# Patient Record
Sex: FEMALE | Race: BLACK OR AFRICAN AMERICAN | NOT HISPANIC OR LATINO | Employment: FULL TIME | URBAN - METROPOLITAN AREA
[De-identification: names, ages, dates, MRNs, and addresses within clinical notes are randomized per-mention and may not be internally consistent; named-entity substitution may affect disease eponyms.]

---

## 2019-01-28 ENCOUNTER — OFFICE VISIT (OUTPATIENT)
Dept: NEPHROLOGY | Facility: CLINIC | Age: 49
End: 2019-01-28
Payer: COMMERCIAL

## 2019-01-28 ENCOUNTER — LAB VISIT (OUTPATIENT)
Dept: LAB | Facility: HOSPITAL | Age: 49
End: 2019-01-28
Attending: INTERNAL MEDICINE
Payer: COMMERCIAL

## 2019-01-28 VITALS
WEIGHT: 260.81 LBS | HEIGHT: 62 IN | SYSTOLIC BLOOD PRESSURE: 100 MMHG | HEART RATE: 74 BPM | DIASTOLIC BLOOD PRESSURE: 60 MMHG | BODY MASS INDEX: 47.99 KG/M2

## 2019-01-28 DIAGNOSIS — N18.30 CHRONIC KIDNEY DISEASE (CKD), STAGE III (MODERATE): ICD-10-CM

## 2019-01-28 DIAGNOSIS — N18.30 CHRONIC KIDNEY DISEASE (CKD), STAGE III (MODERATE): Primary | ICD-10-CM

## 2019-01-28 LAB
ALBUMIN SERPL BCP-MCNC: 3.4 G/DL
ANION GAP SERPL CALC-SCNC: 10 MMOL/L
BUN SERPL-MCNC: 7 MG/DL
CALCIUM SERPL-MCNC: 9.4 MG/DL
CHLORIDE SERPL-SCNC: 104 MMOL/L
CO2 SERPL-SCNC: 23 MMOL/L
CREAT SERPL-MCNC: 1.2 MG/DL
EST. GFR  (AFRICAN AMERICAN): >60 ML/MIN/1.73 M^2
EST. GFR  (NON AFRICAN AMERICAN): 54.3 ML/MIN/1.73 M^2
GLUCOSE SERPL-MCNC: 71 MG/DL
PHOSPHATE SERPL-MCNC: 3 MG/DL
POTASSIUM SERPL-SCNC: 3.8 MMOL/L
SODIUM SERPL-SCNC: 137 MMOL/L

## 2019-01-28 PROCEDURE — 36415 COLL VENOUS BLD VENIPUNCTURE: CPT

## 2019-01-28 PROCEDURE — 99204 PR OFFICE/OUTPT VISIT, NEW, LEVL IV, 45-59 MIN: ICD-10-PCS | Mod: S$GLB,,, | Performed by: INTERNAL MEDICINE

## 2019-01-28 PROCEDURE — 99204 OFFICE O/P NEW MOD 45 MIN: CPT | Mod: S$GLB,,, | Performed by: INTERNAL MEDICINE

## 2019-01-28 PROCEDURE — 99999 PR PBB SHADOW E&M-EST. PATIENT-LVL III: ICD-10-PCS | Mod: PBBFAC,,, | Performed by: INTERNAL MEDICINE

## 2019-01-28 PROCEDURE — 80069 RENAL FUNCTION PANEL: CPT

## 2019-01-28 PROCEDURE — 99999 PR PBB SHADOW E&M-EST. PATIENT-LVL III: CPT | Mod: PBBFAC,,, | Performed by: INTERNAL MEDICINE

## 2019-01-28 RX ORDER — SEMAGLUTIDE 1.34 MG/ML
1 INJECTION, SOLUTION SUBCUTANEOUS WEEKLY
COMMUNITY
Start: 2019-01-04 | End: 2023-02-07 | Stop reason: SDUPTHER

## 2019-01-28 RX ORDER — RIZATRIPTAN BENZOATE 10 MG/1
TABLET ORAL
COMMUNITY
Start: 2018-05-21 | End: 2023-02-07

## 2019-01-28 RX ORDER — ALPRAZOLAM 0.5 MG/1
1 TABLET ORAL DAILY
COMMUNITY
Start: 2019-01-07 | End: 2023-02-07 | Stop reason: SDUPTHER

## 2019-01-28 RX ORDER — LAMOTRIGINE 100 MG/1
TABLET ORAL
COMMUNITY
Start: 2018-12-09 | End: 2023-02-07

## 2019-01-28 RX ORDER — PANTOPRAZOLE SODIUM 40 MG/1
TABLET, DELAYED RELEASE ORAL
COMMUNITY
Start: 2017-03-28 | End: 2023-05-18 | Stop reason: SDUPTHER

## 2019-01-28 RX ORDER — AMLODIPINE BESYLATE 10 MG/1
5 TABLET ORAL DAILY
COMMUNITY
End: 2019-04-22

## 2019-01-28 RX ORDER — AMITRIPTYLINE HYDROCHLORIDE 100 MG/1
TABLET ORAL
COMMUNITY
Start: 2018-02-23 | End: 2020-10-14

## 2019-01-28 RX ORDER — LEVOTHYROXINE SODIUM 88 UG/1
1 TABLET ORAL DAILY
COMMUNITY
Start: 2019-01-04 | End: 2023-02-07

## 2019-01-28 RX ORDER — TOPIRAMATE 50 MG/1
1 CAPSULE, EXTENDED RELEASE ORAL DAILY
COMMUNITY
Start: 2018-12-09 | End: 2023-05-18 | Stop reason: ALTCHOICE

## 2019-01-28 RX ORDER — TRAMADOL HYDROCHLORIDE 50 MG/1
TABLET ORAL
COMMUNITY
Start: 2018-06-14 | End: 2023-02-07

## 2019-01-28 RX ORDER — DULOXETIN HYDROCHLORIDE 60 MG/1
CAPSULE, DELAYED RELEASE ORAL
COMMUNITY
Start: 2018-04-05 | End: 2023-02-07

## 2019-01-28 RX ORDER — TRIAMTERENE/HYDROCHLOROTHIAZID 37.5-25 MG
1 TABLET ORAL EVERY OTHER DAY
COMMUNITY
Start: 2018-12-09 | End: 2020-10-14

## 2019-01-28 NOTE — PATIENT INSTRUCTIONS
Avoid NSAID pain medications such as advil, aleve, motrin, ibuprofen, naprosyn, meloxicam, diclofenac, mobic.     Reduce amlodipine to 5 mg daily      goal blood pressure is less than 130/80    keep a track of daily BPs and bring at next appt     bring all meds in a bag at next appt

## 2019-01-28 NOTE — PROGRESS NOTES
Subjective:       Patient ID: Madeleine Sheikh is a 48 y.o.   female who presents for new evaluation of TEZ , HTN, CKD stage 2/3     Allegra Cruz MD        HPI :  Madeleine Sheikh is a pleasant 48-year-old  woman seen in office today in consultation for above medical problems on referral from primary MD.  Pertinent previous provider notes and lab results were reviewed.  Lab results reviewed from Care everywhere.  Patient has longstanding history of type 2 diabetes and hypertension.  She was recently started on Invokana, following starting this medication serum creatinine increased to 1.4 mg/dL according to the patient, no prior labs available for comparison, her GFR dropped to 50 mL/minute, patient discontinued the medication, repeat labs from today are pending at this time, she denies recent NSAID use.  She has long history of migraine headaches in closely followed by Neurology Department.  She also has history of fibromyalgia and used to see Rheumatology in the past.  No recent hospitalizations or ER visits.          Past Medical History:   Diagnosis Date    CKD (chronic kidney disease) stage 3, GFR 30-59 ml/min     Diabetes mellitus     Hypertension     Migraine        PSH : Appendectomy, C section, Lap Bernie, Gastric bypass,     Current Outpatient Medications on File Prior to Visit   Medication Sig Dispense Refill    ALPRAZolam (XANAX) 0.5 MG tablet Take 1 tablet by mouth once daily.      amitriptyline (ELAVIL) 100 MG tablet TAKE 1 TABLET EVERY DAY      amLODIPine (NORVASC) 10 MG tablet Take 10 mg by mouth once daily.      cholecalciferol, vitamin D3, 4,000 unit Cap 2 capsules every day by oral route.      DULoxetine (CYMBALTA) 60 MG capsule Take 1 capsule every day by oral route at bedtime for 90 days.      lamoTRIgine (LAMICTAL) 100 MG tablet       levothyroxine (SYNTHROID) 88 MCG tablet Take 1 tablet by mouth once daily.      OZEMPIC 1 mg/0.75  mL (2 mg/1.5 mL) PnIj       pantoprazole (PROTONIX) 40 MG tablet Take 1 tablet every day by oral route for 90 days.      rizatriptan (MAXALT) 10 MG tablet TAKE 1 PILL @ START OF MIGRAINE MAY REPEAT IN 2 HOURS IF NEEDED. MAX OF 2 PILLS IN 24 HOURS      traMADol (ULTRAM) 50 mg tablet Take 1 tablet every 6 hours by oral route as needed.      triamterene-hydrochlorothiazide 37.5-25 mg (MAXZIDE-25) 37.5-25 mg per tablet Take 1 tablet by mouth every other day.      TROKENDI XR 50 mg Cp24 Take 1 tablet by mouth once daily.       No current facility-administered medications on file prior to visit.          Review of patient's allergies indicates:   Allergen Reactions    Amoxicillin-pot clavulanate Anaphylaxis and Other (See Comments)     Liver issues       Azithromycin Swelling and Nausea And Vomiting    Doxycycline Shortness Of Breath and Nausea And Vomiting    Morphine Hallucinations and Shortness Of Breath     hallucinations      Sumatriptan Swelling    Tioconazole Hives    Varenicline Shortness Of Breath and Swelling     headaches       FH : denies FH of kidney disease    SH : works for health insurance,  Current every day smoker ( 3 Cigs a day), no alcohol, has a son,       Review of Systems   Constitutional: Negative for activity change, appetite change, fatigue and fever.   HENT: Negative for congestion, facial swelling, sore throat, trouble swallowing and voice change.    Eyes: Negative for redness and visual disturbance.   Respiratory: Negative for apnea, cough, chest tightness, shortness of breath and wheezing.    Cardiovascular: Negative for chest pain, palpitations and leg swelling.   Gastrointestinal: Negative for abdominal distention, abdominal pain, blood in stool, constipation, diarrhea, nausea and vomiting.   Genitourinary: Negative for decreased urine volume, difficulty urinating, dysuria, flank pain, frequency, hematuria, pelvic pain and urgency.   Musculoskeletal: Positive for arthralgias.  Negative for back pain, gait problem and joint swelling.   Skin: Negative for color change and rash.   Neurological: Negative for dizziness, syncope, weakness and headaches.   Hematological: Does not bruise/bleed easily.   Psychiatric/Behavioral: Negative for agitation, behavioral problems and confusion. The patient is not nervous/anxious.                  Objective:         Vitals:    01/28/19 0839   BP: 100/60   Pulse: 74       Weight 260 lbs ,         Physical Exam   Constitutional: She is oriented to person, place, and time. She appears well-developed and well-nourished. No distress.   HENT:   Head: Normocephalic and atraumatic.   Mouth/Throat: Oropharynx is clear and moist. No oropharyngeal exudate.   Eyes: Conjunctivae and EOM are normal. Pupils are equal, round, and reactive to light.   Neck: Normal range of motion. Neck supple. No JVD present. Carotid bruit is not present. No tracheal deviation present. No thyroid mass and no thyromegaly present.   Cardiovascular: Normal rate, regular rhythm, normal heart sounds and intact distal pulses. Exam reveals no gallop and no friction rub.   No murmur heard.  Pulmonary/Chest: Effort normal and breath sounds normal. No respiratory distress. She has no wheezes. She has no rales. She exhibits no tenderness.   Abdominal: Soft. Bowel sounds are normal. She exhibits no distension, no abdominal bruit, no ascites and no mass. There is no hepatosplenomegaly. There is no tenderness. There is no rebound, no guarding and no CVA tenderness.   Obese abdomen    Musculoskeletal: Normal range of motion. She exhibits no edema or tenderness.   Lymphadenopathy:     She has no cervical adenopathy.   Neurological: She is alert and oriented to person, place, and time. She has normal reflexes. She displays normal reflexes. No cranial nerve deficit. She exhibits normal muscle tone. Coordination normal.   Skin: Skin is warm and intact. No rash noted. No erythema. No pallor.   Psychiatric: She  has a normal mood and affect. Her behavior is normal. Judgment normal.     :            Labs Reviewed from Care Every Where      Impression and Plan :  48-year-old  woman seen in office today in consultation for following medical problems    1. TEZ on CKD stage 2/3 :  Limited lab data available at this time, her serum creatinine was 1.4 on 12/21/2018, repeat labs from today are pending at this time, patient discontinued Invokana, denies recent NSAID use, chronic kidney disease due to longstanding history of hypertension and diabetes,    2.  Hypertension - blood pressure on the low side, advised patient to reduce amlodipine to 5 mg daily, continue Dyazide, continue to monitor blood pressures at home, target blood pressure less than 130/80,    3.  Type 2 diabetes - discussed adherence with diet and medications,    4.  Morbid obesity - discuss calorie restriction, daily exercise,    5.  Discussed smoking cessation,    6.  Check urinalysis to evaluate for proteinuria and hematuria    Return to clinic in about 3 months, more than 40 min of face-to-face time was spent with the patient discussing labs and plan of care, also time was spent educating patient about chronic kidney disease.  Thank you Dr. Cruz for involving us in the care of this pleasant woman.    Sincerely,    Francisco Herman MD    Cc to Allegra Cruz MD

## 2019-01-28 NOTE — LETTER
January 28, 2019      Allegra Cruz MD  [address]  [place] [address]           [facility]  [address]  [place] [address]  Phone: [phone]  Fax: [phone]          Patient: Madeleine Sheikh   MR Number: [id number]   YOB: 1970   Date of Visit: 1/28/2019       Dear Dr. Allegra Cruz:    Thank you for referring Madeleine Sheikh to me for evaluation. Attached you will find relevant portions of my assessment and plan of care.    If you have questions, please do not hesitate to call me. I look forward to following Madeleine Sheikh along with you.    Sincerely,    Francisco Herman MD    Enclosure  CC:  No Recipients    If you would like to receive this communication electronically, please contact [email or web address] or [phone] to request more information on [employer] Link access.    For providers and/or their staff who would like to refer a patient to [facility], please contact us through our one-stop-shop provider referral line, [facility], at [phone].    If you feel you have received this communication in error or would no longer like to receive these types of communications, please e-mail [email or web address]

## 2019-04-15 ENCOUNTER — LAB VISIT (OUTPATIENT)
Dept: LAB | Facility: HOSPITAL | Age: 49
End: 2019-04-15
Attending: INTERNAL MEDICINE
Payer: COMMERCIAL

## 2019-04-15 DIAGNOSIS — N18.30 CHRONIC KIDNEY DISEASE (CKD), STAGE III (MODERATE): ICD-10-CM

## 2019-04-15 LAB
ALBUMIN SERPL BCP-MCNC: 3.3 G/DL (ref 3.5–5.2)
ANION GAP SERPL CALC-SCNC: 8 MMOL/L (ref 8–16)
BASOPHILS # BLD AUTO: 0.04 K/UL (ref 0–0.2)
BASOPHILS NFR BLD: 0.4 % (ref 0–1.9)
BUN SERPL-MCNC: 7 MG/DL (ref 6–20)
CALCIUM SERPL-MCNC: 9.6 MG/DL (ref 8.7–10.5)
CHLORIDE SERPL-SCNC: 97 MMOL/L (ref 95–110)
CO2 SERPL-SCNC: 29 MMOL/L (ref 23–29)
CREAT SERPL-MCNC: 1.2 MG/DL (ref 0.5–1.4)
DIFFERENTIAL METHOD: ABNORMAL
EOSINOPHIL # BLD AUTO: 0.2 K/UL (ref 0–0.5)
EOSINOPHIL NFR BLD: 1.7 % (ref 0–8)
ERYTHROCYTE [DISTWIDTH] IN BLOOD BY AUTOMATED COUNT: 18.9 % (ref 11.5–14.5)
EST. GFR  (AFRICAN AMERICAN): >60 ML/MIN/1.73 M^2
EST. GFR  (NON AFRICAN AMERICAN): 54.3 ML/MIN/1.73 M^2
GLUCOSE SERPL-MCNC: 66 MG/DL (ref 70–110)
HCT VFR BLD AUTO: 34.8 % (ref 37–48.5)
HGB BLD-MCNC: 10.8 G/DL (ref 12–16)
IMM GRANULOCYTES # BLD AUTO: 0.03 K/UL (ref 0–0.04)
IMM GRANULOCYTES NFR BLD AUTO: 0.3 % (ref 0–0.5)
LYMPHOCYTES # BLD AUTO: 3.7 K/UL (ref 1–4.8)
LYMPHOCYTES NFR BLD: 33.5 % (ref 18–48)
MCH RBC QN AUTO: 24.5 PG (ref 27–31)
MCHC RBC AUTO-ENTMCNC: 31 G/DL (ref 32–36)
MCV RBC AUTO: 79 FL (ref 82–98)
MONOCYTES # BLD AUTO: 0.5 K/UL (ref 0.3–1)
MONOCYTES NFR BLD: 4.8 % (ref 4–15)
NEUTROPHILS # BLD AUTO: 6.5 K/UL (ref 1.8–7.7)
NEUTROPHILS NFR BLD: 59.3 % (ref 38–73)
NRBC BLD-RTO: 0 /100 WBC
PHOSPHATE SERPL-MCNC: 3.3 MG/DL (ref 2.7–4.5)
PLATELET # BLD AUTO: 505 K/UL (ref 150–350)
PMV BLD AUTO: 9.8 FL (ref 9.2–12.9)
POTASSIUM SERPL-SCNC: 3.7 MMOL/L (ref 3.5–5.1)
RBC # BLD AUTO: 4.41 M/UL (ref 4–5.4)
SODIUM SERPL-SCNC: 134 MMOL/L (ref 136–145)
WBC # BLD AUTO: 10.98 K/UL (ref 3.9–12.7)

## 2019-04-15 PROCEDURE — 36415 COLL VENOUS BLD VENIPUNCTURE: CPT

## 2019-04-15 PROCEDURE — 85025 COMPLETE CBC W/AUTO DIFF WBC: CPT

## 2019-04-15 PROCEDURE — 80069 RENAL FUNCTION PANEL: CPT

## 2019-04-22 ENCOUNTER — OFFICE VISIT (OUTPATIENT)
Dept: NEPHROLOGY | Facility: CLINIC | Age: 49
End: 2019-04-22
Payer: COMMERCIAL

## 2019-04-22 VITALS
HEIGHT: 62 IN | SYSTOLIC BLOOD PRESSURE: 114 MMHG | BODY MASS INDEX: 45.28 KG/M2 | HEART RATE: 72 BPM | DIASTOLIC BLOOD PRESSURE: 74 MMHG | WEIGHT: 246.06 LBS

## 2019-04-22 DIAGNOSIS — I10 HTN (HYPERTENSION), BENIGN: Primary | ICD-10-CM

## 2019-04-22 PROCEDURE — 99999 PR PBB SHADOW E&M-EST. PATIENT-LVL III: CPT | Mod: PBBFAC,,, | Performed by: INTERNAL MEDICINE

## 2019-04-22 PROCEDURE — 99214 PR OFFICE/OUTPT VISIT, EST, LEVL IV, 30-39 MIN: ICD-10-PCS | Mod: S$GLB,,, | Performed by: INTERNAL MEDICINE

## 2019-04-22 PROCEDURE — 99999 PR PBB SHADOW E&M-EST. PATIENT-LVL III: ICD-10-PCS | Mod: PBBFAC,,, | Performed by: INTERNAL MEDICINE

## 2019-04-22 PROCEDURE — 99214 OFFICE O/P EST MOD 30 MIN: CPT | Mod: S$GLB,,, | Performed by: INTERNAL MEDICINE

## 2019-04-22 RX ORDER — ACETAMINOPHEN AND PHENYLEPHRINE HCL 325; 5 MG/1; MG/1
TABLET ORAL
COMMUNITY
Start: 2018-01-05 | End: 2023-02-07 | Stop reason: SDUPTHER

## 2019-04-22 RX ORDER — CYANOCOBALAMIN (VITAMIN B-12) 500 MCG
TABLET ORAL
COMMUNITY
Start: 2018-01-05 | End: 2023-02-07 | Stop reason: SDUPTHER

## 2019-04-22 NOTE — LETTER
April 22, 2019        Allegra Cruz MD  [address]  [place] [address]             [facility]  [address]  [place] [address]  Phone: [phone]  Fax: [phone]   Patient: Madeleine Sheikh   MR Number: [id number]   YOB: 1970   Date of Visit: 4/22/2019       Dear Dr. Cruz:    Thank you for referring Madeleine Sheikh to me for evaluation. Attached you will find relevant portions of my assessment and plan of care.    If you have questions, please do not hesitate to call me. I look forward to following Madeleine Sheikh along with you.    Sincerely,      Francisco Herman MD

## 2019-04-22 NOTE — PROGRESS NOTES
Subjective:       Patient ID: Madeleine Sheikh is a 48 y.o.   female who presents for follow-up evaluation of HTN, CKD stage 2/3 , DM-2        Allegra Cruz MD      HPI :  Madeleine Sheikh is a pleasant 48-year-old  woman seen in office today in f/u  for above medical problems .  I saw her for initial consult about 3 months ago.  Recent labs reviewed and discussed with the patient.  Serum creatinine improved from 1.4 mg/dL to 1.2  mg/dL on recent labs, acute kidney injury likely due to Invokana that she was taking at that time, patient denies NSAID use,     Patient has longstanding history of type 2 diabetes and hypertension.   she denies recent NSAID use.  She has long history of migraine headaches in closely followed by Neurology Department.  She also has history of fibromyalgia and used to see Rheumatology in the past.  No recent hospitalizations or ER visits.             Past Medical History:   Diagnosis Date    CKD (chronic kidney disease) stage 3, GFR 30-59 ml/min     Diabetes mellitus     Hypertension     Migraine          Current Outpatient Medications on File Prior to Visit   Medication Sig Dispense Refill    ALPRAZolam (XANAX) 0.5 MG tablet Take 1 tablet by mouth once daily.      amitriptyline (ELAVIL) 100 MG tablet TAKE 1 TABLET EVERY DAY      biotin 10,000 mcg Cap       cholecalciferol, vitamin D3, 4,000 unit Cap 2 capsules every day by oral route.      DULoxetine (CYMBALTA) 60 MG capsule Take 1 capsule every day by oral route at bedtime for 90 days.      erenumab-aooe (AIMOVIG AUTOINJECTOR SUBQ) every 30 days.       folic acid 0.8 mg Cap       lamoTRIgine (LAMICTAL) 100 MG tablet       levothyroxine (SYNTHROID) 88 MCG tablet Take 1 tablet by mouth once daily.      m-vit,tx,iron,mins/calc/folic (MULTIVITAMIN) Tab       OZEMPIC 1 mg/0.75 mL (2 mg/1.5 mL) PnIj       pantoprazole (PROTONIX) 40 MG tablet Take 1 tablet every day by oral  route for 90 days.      rizatriptan (MAXALT) 10 MG tablet TAKE 1 PILL @ START OF MIGRAINE MAY REPEAT IN 2 HOURS IF NEEDED. MAX OF 2 PILLS IN 24 HOURS      traMADol (ULTRAM) 50 mg tablet Take 1 tablet every 6 hours by oral route as needed.      triamterene-hydrochlorothiazide 37.5-25 mg (MAXZIDE-25) 37.5-25 mg per tablet Take 1 tablet by mouth every other day.      TROKENDI XR 50 mg Cp24 Take 1 tablet by mouth once daily.      [DISCONTINUED] amLODIPine (NORVASC) 10 MG tablet Take 5 mg by mouth once daily.       No current facility-administered medications on file prior to visit.        Review of Systems   Constitutional: Negative for activity change, appetite change, fatigue and fever.   HENT: Negative for congestion, facial swelling, sore throat, trouble swallowing and voice change.    Eyes: Negative for redness and visual disturbance.   Respiratory: Negative for apnea, cough, chest tightness, shortness of breath and wheezing.    Cardiovascular: Negative for chest pain, palpitations and leg swelling.   Gastrointestinal: Negative for abdominal distention, abdominal pain, blood in stool, constipation, diarrhea, nausea and vomiting.   Genitourinary: Negative for decreased urine volume, difficulty urinating, dysuria, flank pain, frequency, hematuria, pelvic pain and urgency.   Musculoskeletal: Positive for arthralgias. Negative for back pain, gait problem and joint swelling.   Skin: Negative for color change and rash.   Neurological: Negative for dizziness, syncope, weakness and headaches.   Hematological: Does not bruise/bleed easily.   Psychiatric/Behavioral: Negative for agitation, behavioral problems and confusion. The patient is not nervous/anxious.      :            Objective:           Vitals:    04/22/19 1543   BP: 114/74   Pulse: 72       Weight 246 lbs, last weight 260 lbs       Physical Exam  :      Constitutional: She is oriented to person, place, and time. She appears well-developed and well-nourished.  No distress.   HENT: Head: Normocephalic and atraumatic. : Oropharynx is clear and moist. No oropharyngeal exudate.   Eyes: Conjunctivae and EOM are normal. Pupils are equal, round, and reactive to light.   Neck: Normal range of motion. Neck supple. No JVD present. Carotid bruit is not present. No tracheal deviation present. No thyroid mass and no thyromegaly present.   Cardiovascular: Normal rate, regular rhythm, normal heart sounds and intact distal pulses. Exam reveals no gallop and no friction rub.   No murmur heard.  Pulmonary/Chest: Effort normal and breath sounds normal. No respiratory distress. She has no wheezes. She has no rales. She exhibits no tenderness.   Abdominal: Soft. Bowel sounds are normal. She exhibits no distension, no abdominal bruit, no ascites and no mass. There is no hepatosplenomegaly. There is no tenderness. There is no rebound, no guarding and no CVA tenderness.   Obese abdomen    Musculoskeletal: Normal range of motion. She exhibits no edema or tenderness.   Lymphadenopathy:   She has no cervical adenopathy.   Neurological: She is alert and oriented to person, place, and time.  No cranial nerve deficit. She exhibits normal muscle tone. Coordination normal.   Skin: Skin is warm and intact. No rash noted. No erythema. No pallor.   Psychiatric: She has a normal mood and affect. Her behavior is normal. Judgment normal.     :      Labs:    Lab Results   Component Value Date    CREATININE 1.2 04/15/2019    BUN 7 04/15/2019     (L) 04/15/2019    K 3.7 04/15/2019    CL 97 04/15/2019    CO2 29 04/15/2019       Lab Results   Component Value Date    WBC 10.98 04/15/2019    HGB 10.8 (L) 04/15/2019    HCT 34.8 (L) 04/15/2019    MCV 79 (L) 04/15/2019     (H) 04/15/2019       Lab Results   Component Value Date    CALCIUM 9.6 04/15/2019    PHOS 3.3 04/15/2019       Impression and Plan :  48-year-old  woman seen in office today in f/u for following medical problems     1.  TEZ on CKD stage 2 :  resolved, recent serum creatinine is 1.2 mg/dL, acute kidney injury likely due to Invokana .      2.  Hypertension - blood pressure controlled,  target blood pressure less than 130/80, currently on Maxzide 37.5 / 25 mg every other day,    In the interim she tried amlodipine, caused peripheral edema, Bystolic dropped her blood pressures,     3.  Type 2 diabetes - discussed adherence with diet and medications,     4.  Morbid obesity  - discussed calorie restriction, daily exercise, has been losing weight      5.  Discussed smoking cessation,     6.  urinalysis reviewed , no proteinuria, mild hematuria, follow,     7. Lytes, mild hyponatremia noted, will monitor     Return to clinic in about 6 months, more than  25 min of face-to-face time was spent with the patient discussing labs and plan of care,      Francisco Herman MD

## 2019-10-03 ENCOUNTER — LAB VISIT (OUTPATIENT)
Dept: LAB | Facility: HOSPITAL | Age: 49
End: 2019-10-03
Attending: INTERNAL MEDICINE
Payer: COMMERCIAL

## 2019-10-03 DIAGNOSIS — I10 HTN (HYPERTENSION), BENIGN: ICD-10-CM

## 2019-10-03 LAB
ALBUMIN SERPL BCP-MCNC: 3.8 G/DL (ref 3.5–5.2)
ANION GAP SERPL CALC-SCNC: 11 MMOL/L (ref 8–16)
BASOPHILS # BLD AUTO: 0.05 K/UL (ref 0–0.2)
BASOPHILS NFR BLD: 0.4 % (ref 0–1.9)
BUN SERPL-MCNC: 15 MG/DL (ref 6–20)
CALCIUM SERPL-MCNC: 9.8 MG/DL (ref 8.7–10.5)
CHLORIDE SERPL-SCNC: 97 MMOL/L (ref 95–110)
CO2 SERPL-SCNC: 27 MMOL/L (ref 23–29)
CREAT SERPL-MCNC: 1.3 MG/DL (ref 0.5–1.4)
DIFFERENTIAL METHOD: ABNORMAL
EOSINOPHIL # BLD AUTO: 0.1 K/UL (ref 0–0.5)
EOSINOPHIL NFR BLD: 1.1 % (ref 0–8)
ERYTHROCYTE [DISTWIDTH] IN BLOOD BY AUTOMATED COUNT: 14.2 % (ref 11.5–14.5)
EST. GFR  (AFRICAN AMERICAN): 56.5 ML/MIN/1.73 M^2
EST. GFR  (NON AFRICAN AMERICAN): 49 ML/MIN/1.73 M^2
GLUCOSE SERPL-MCNC: 75 MG/DL (ref 70–110)
HCT VFR BLD AUTO: 41.4 % (ref 37–48.5)
HGB BLD-MCNC: 13.9 G/DL (ref 12–16)
IMM GRANULOCYTES # BLD AUTO: 0.03 K/UL (ref 0–0.04)
IMM GRANULOCYTES NFR BLD AUTO: 0.2 % (ref 0–0.5)
LYMPHOCYTES # BLD AUTO: 4 K/UL (ref 1–4.8)
LYMPHOCYTES NFR BLD: 32.5 % (ref 18–48)
MCH RBC QN AUTO: 31.6 PG (ref 27–31)
MCHC RBC AUTO-ENTMCNC: 33.6 G/DL (ref 32–36)
MCV RBC AUTO: 94 FL (ref 82–98)
MONOCYTES # BLD AUTO: 0.7 K/UL (ref 0.3–1)
MONOCYTES NFR BLD: 5.3 % (ref 4–15)
NEUTROPHILS # BLD AUTO: 7.4 K/UL (ref 1.8–7.7)
NEUTROPHILS NFR BLD: 60.5 % (ref 38–73)
NRBC BLD-RTO: 0 /100 WBC
PHOSPHATE SERPL-MCNC: 3 MG/DL (ref 2.7–4.5)
PLATELET # BLD AUTO: 351 K/UL (ref 150–350)
PMV BLD AUTO: 10.8 FL (ref 9.2–12.9)
POTASSIUM SERPL-SCNC: 3.8 MMOL/L (ref 3.5–5.1)
RBC # BLD AUTO: 4.4 M/UL (ref 4–5.4)
SODIUM SERPL-SCNC: 135 MMOL/L (ref 136–145)
WBC # BLD AUTO: 12.22 K/UL (ref 3.9–12.7)

## 2019-10-03 PROCEDURE — 80069 RENAL FUNCTION PANEL: CPT

## 2019-10-03 PROCEDURE — 85025 COMPLETE CBC W/AUTO DIFF WBC: CPT

## 2019-10-03 PROCEDURE — 36415 COLL VENOUS BLD VENIPUNCTURE: CPT

## 2019-10-10 ENCOUNTER — OFFICE VISIT (OUTPATIENT)
Dept: NEPHROLOGY | Facility: CLINIC | Age: 49
End: 2019-10-10
Payer: COMMERCIAL

## 2019-10-10 VITALS
WEIGHT: 229.94 LBS | HEART RATE: 78 BPM | RESPIRATION RATE: 20 BRPM | SYSTOLIC BLOOD PRESSURE: 118 MMHG | DIASTOLIC BLOOD PRESSURE: 78 MMHG | HEIGHT: 62 IN | BODY MASS INDEX: 42.31 KG/M2

## 2019-10-10 DIAGNOSIS — N18.30 CHRONIC KIDNEY DISEASE (CKD), STAGE III (MODERATE): Primary | ICD-10-CM

## 2019-10-10 PROCEDURE — 99999 PR PBB SHADOW E&M-EST. PATIENT-LVL III: ICD-10-PCS | Mod: PBBFAC,,, | Performed by: INTERNAL MEDICINE

## 2019-10-10 PROCEDURE — 99999 PR PBB SHADOW E&M-EST. PATIENT-LVL III: CPT | Mod: PBBFAC,,, | Performed by: INTERNAL MEDICINE

## 2019-10-10 PROCEDURE — 99213 OFFICE O/P EST LOW 20 MIN: CPT | Mod: S$GLB,,, | Performed by: INTERNAL MEDICINE

## 2019-10-10 PROCEDURE — 99213 PR OFFICE/OUTPT VISIT, EST, LEVL III, 20-29 MIN: ICD-10-PCS | Mod: S$GLB,,, | Performed by: INTERNAL MEDICINE

## 2019-10-10 NOTE — PROGRESS NOTES
Subjective:       Patient ID: Madeleine Sheikh is a 49 y.o.   female who presents for follow-up evaluation of HTN, CKD stage 2/3 , DM-2        Allegra Cruz MD      HPI : Madeleine Sheikh is a pleasant 49 -year-old  woman seen in office today in f/u  for above medical problems .  I saw her in clinic about 6  months ago.  Recent labs reviewed and discussed with the patient.  Serum creatinine is stable at 1.3 mg/dL, no recent hospitalizations or ER visits,        Patient has longstanding history of type 2 diabetes and hypertension.   she denies recent NSAID use.  She has long history of migraine headaches in closely followed by Neurology Department.  She also has history of fibromyalgia and used to see Rheumatology in the past.           Past Medical History:   Diagnosis Date    CKD (chronic kidney disease) stage 3, GFR 30-59 ml/min     Diabetes mellitus     Hypertension     Migraine        Current Outpatient Medications on File Prior to Visit   Medication Sig Dispense Refill    ALPRAZolam (XANAX) 0.5 MG tablet Take 1 tablet by mouth once daily.      biotin 10,000 mcg Cap       cholecalciferol, vitamin D3, 4,000 unit Cap 2 capsules every day by oral route.      DULoxetine (CYMBALTA) 60 MG capsule Take 1 capsule every day by oral route at bedtime for 90 days.      erenumab-aooe (AIMOVIG AUTOINJECTOR SUBQ) every 30 days.       folic acid 0.8 mg Cap       lamoTRIgine (LAMICTAL) 100 MG tablet       levothyroxine (SYNTHROID) 88 MCG tablet Take 1 tablet by mouth once daily.      m-vit,tx,iron,mins/calc/folic (MULTIVITAMIN) Tab       OZEMPIC 1 mg/0.75 mL (2 mg/1.5 mL) PnIj       pantoprazole (PROTONIX) 40 MG tablet Take 1 tablet every day by oral route for 90 days.      rizatriptan (MAXALT) 10 MG tablet TAKE 1 PILL @ START OF MIGRAINE MAY REPEAT IN 2 HOURS IF NEEDED. MAX OF 2 PILLS IN 24 HOURS      traMADol (ULTRAM) 50 mg tablet Take 1 tablet every 6  hours by oral route as needed.      triamterene-hydrochlorothiazide 37.5-25 mg (MAXZIDE-25) 37.5-25 mg per tablet Take 1 tablet by mouth every other day.      TROKENDI XR 50 mg Cp24 Take 1 tablet by mouth once daily.      amitriptyline (ELAVIL) 100 MG tablet TAKE 1 TABLET EVERY DAY       No current facility-administered medications on file prior to visit.          Review of Systems  :    Constitutional: Negative for activity change, appetite change, fatigue and fever.   HENT: Negative for congestion, facial swelling, sore throat, trouble swallowing and voice change.    Eyes: Negative for redness and visual disturbance.   Respiratory: Negative for apnea, cough, chest tightness, shortness of breath and wheezing.    Cardiovascular: Negative for chest pain, palpitations and leg swelling.   Gastrointestinal: Negative for abdominal distention, abdominal pain, blood in stool, constipation, diarrhea, nausea and vomiting.   Genitourinary: Negative for decreased urine volume, difficulty urinating, dysuria, flank pain, frequency, hematuria, pelvic pain and urgency.   Musculoskeletal: Positive for arthralgias. Negative for back pain, gait problem and joint swelling.   Skin: Negative for color change and rash.   Neurological: Negative for dizziness, syncope, weakness and headaches.   Hematological: Does not bruise/bleed easily.   Psychiatric/Behavioral: Negative for agitation, behavioral problems and confusion. The patient is not nervous/anxious.        Objective:         Vitals:    10/10/19 1613   BP: 118/78   Pulse: 78   Resp: 20       Weight 229 lbs , last weight 246 lbs       Physical Exam  :        Constitutional: She is oriented to person, place, and time. She appears well-developed and well-nourished. No distress.   HENT: Head: Normocephalic and atraumatic. : Oropharynx is clear and moist. No oropharyngeal exudate.   Eyes: Conjunctivae and EOM are normal. Pupils are equal, round, and reactive to light.   Neck: Normal  range of motion. Neck supple. No JVD present. Carotid bruit is not present. No tracheal deviation present. No thyroid mass and no thyromegaly present.   Cardiovascular: Normal rate, regular rhythm, normal heart sounds and intact distal pulses. Exam reveals no gallop and no friction rub.   No murmur heard.  Pulmonary/Chest: Effort normal and breath sounds normal. No respiratory distress. She has no wheezes. She has no rales. She exhibits no tenderness.   Abdominal: Soft. Bowel sounds are normal. She exhibits no distension, no abdominal bruit, no ascites and no mass. There is no hepatosplenomegaly. There is no tenderness. There is no rebound, no guarding and no CVA tenderness.   Obese abdomen    Musculoskeletal: Normal range of motion. She exhibits no edema or tenderness.   Lymphadenopathy:   She has no cervical adenopathy.   Neurological: She is alert and oriented to person, place, and time.  No cranial nerve deficit. She exhibits normal muscle tone. Coordination normal.   Skin: Skin is warm and intact. No rash noted. No erythema. No pallor.   Psychiatric: She has a normal mood and affect. Her behavior is normal. Judgment normal.     :      Labs:    Lab Results   Component Value Date    CREATININE 1.3 10/03/2019    BUN 15 10/03/2019     (L) 10/03/2019    K 3.8 10/03/2019    CL 97 10/03/2019    CO2 27 10/03/2019     Lab Results   Component Value Date    WBC 12.22 10/03/2019    HGB 13.9 10/03/2019    HCT 41.4 10/03/2019    MCV 94 10/03/2019     (H) 10/03/2019       Lab Results   Component Value Date    CALCIUM 9.8 10/03/2019    PHOS 3.0 10/03/2019       Lab Results   Component Value Date    ALBUMIN 3.8 10/03/2019       Impression and Plan :  49 -year-old  woman seen in office today in f/u for following medical problems     1. CKD stage 2/3  :  recent serum creatinine is stable at 1.3 mg/dL, again advised patient to avoid NSAID use in future,     2.  Hypertension - blood pressure controlled,   target blood pressure less than 130/80, currently on Maxzide 37.5 / 25 mg every other day,     she tried amlodipine, caused peripheral edema, Bystolic dropped her blood pressures,      3.  Type 2 diabetes - discussed adherence with diet and medications,     4.  Morbid obesity  - discussed calorie restriction, daily exercise, has been losing weight      5.    urinalysis reviewed , no proteinuria, mild hematuria, follow,      6. Lytes, mild hyponatremia noted, will monitor     Return to clinic in a year or earlier if needed,  more than  20 min of face-to-face time was spent with the patient discussing labs and plan of care,      Francisco Herman MD

## 2019-10-10 NOTE — LETTER
October 10, 2019        Allegra Cruz MD  [address]  [place] [address]             [facility]  [address]  [place] [address]  Phone: [phone]  Fax: [phone]   Patient: Madeleine Sheikh   MR Number: [id number]   YOB: 1970   Date of Visit: 10/10/2019       Dear Dr. Cruz:    Thank you for referring Madeleine Sheikh to me for evaluation. Attached you will find relevant portions of my assessment and plan of care.    If you have questions, please do not hesitate to call me. I look forward to following Madeleine Sheikh along with you.    Sincerely,      Francisco Herman MD            CC  No Recipients    Enclosure

## 2019-10-10 NOTE — PATIENT INSTRUCTIONS
Avoid NSAID pain medications such as advil, aleve, motrin, ibuprofen, naprosyn, meloxicam, diclofenac, mobic.       Drink at least 60-70 oz o fluids a a day     Low salt diet

## 2020-09-02 ENCOUNTER — TELEPHONE (OUTPATIENT)
Dept: NEPHROLOGY | Facility: CLINIC | Age: 50
End: 2020-09-02

## 2020-09-02 DIAGNOSIS — N18.30 CHRONIC KIDNEY DISEASE (CKD), STAGE III (MODERATE): Primary | ICD-10-CM

## 2020-09-02 NOTE — TELEPHONE ENCOUNTER
----- Message from Darby Jose sent at 9/2/2020  9:56 AM CDT -----  Regarding: orders  Contact: chandan  Patient requesting lab orders before her appt ion October, please call her back at [phone]

## 2020-10-06 ENCOUNTER — LAB VISIT (OUTPATIENT)
Dept: LAB | Facility: HOSPITAL | Age: 50
End: 2020-10-06
Attending: INTERNAL MEDICINE
Payer: COMMERCIAL

## 2020-10-06 DIAGNOSIS — N18.30 CHRONIC KIDNEY DISEASE (CKD), STAGE III (MODERATE): ICD-10-CM

## 2020-10-06 LAB
ALBUMIN SERPL BCP-MCNC: 3.4 G/DL (ref 3.5–5.2)
ANION GAP SERPL CALC-SCNC: 10 MMOL/L (ref 8–16)
BUN SERPL-MCNC: 8 MG/DL (ref 6–20)
CALCIUM SERPL-MCNC: 8.8 MG/DL (ref 8.7–10.5)
CHLORIDE SERPL-SCNC: 102 MMOL/L (ref 95–110)
CO2 SERPL-SCNC: 24 MMOL/L (ref 23–29)
CREAT SERPL-MCNC: 1.1 MG/DL (ref 0.5–1.4)
EST. GFR  (AFRICAN AMERICAN): >60 ML/MIN/1.73 M^2
EST. GFR  (NON AFRICAN AMERICAN): 60 ML/MIN/1.73 M^2
GLUCOSE SERPL-MCNC: 78 MG/DL (ref 70–110)
PHOSPHATE SERPL-MCNC: 3.1 MG/DL (ref 2.7–4.5)
POTASSIUM SERPL-SCNC: 4.1 MMOL/L (ref 3.5–5.1)
PTH-INTACT SERPL-MCNC: 56 PG/ML (ref 9–77)
SODIUM SERPL-SCNC: 136 MMOL/L (ref 136–145)

## 2020-10-06 PROCEDURE — 36415 COLL VENOUS BLD VENIPUNCTURE: CPT

## 2020-10-06 PROCEDURE — 80069 RENAL FUNCTION PANEL: CPT

## 2020-10-06 PROCEDURE — 83970 ASSAY OF PARATHORMONE: CPT

## 2020-10-14 ENCOUNTER — OFFICE VISIT (OUTPATIENT)
Dept: NEPHROLOGY | Facility: CLINIC | Age: 50
End: 2020-10-14
Payer: COMMERCIAL

## 2020-10-14 DIAGNOSIS — R31.9 HEMATURIA SYNDROME: ICD-10-CM

## 2020-10-14 DIAGNOSIS — I10 HTN (HYPERTENSION), BENIGN: ICD-10-CM

## 2020-10-14 DIAGNOSIS — N18.2 CHRONIC KIDNEY DISEASE (CKD), STAGE II (MILD): Primary | ICD-10-CM

## 2020-10-14 PROCEDURE — 99214 OFFICE O/P EST MOD 30 MIN: CPT | Mod: 95,,, | Performed by: INTERNAL MEDICINE

## 2020-10-14 PROCEDURE — 99214 PR OFFICE/OUTPT VISIT, EST, LEVL IV, 30-39 MIN: ICD-10-PCS | Mod: 95,,, | Performed by: INTERNAL MEDICINE

## 2020-10-14 RX ORDER — HYDROCHLOROTHIAZIDE 12.5 MG/1
12.5 TABLET ORAL DAILY
Qty: 30 TABLET | Refills: 11
Start: 2020-10-14 | End: 2021-10-14

## 2020-10-14 NOTE — PROGRESS NOTES
Subjective:       Patient ID: Madeleine Sheikh is a 50 y.o.   female who presents for follow-up evaluation of HTN, CKD stage 2  , DM-2        Allegra Cruz MD       The patient location is: home   The chief complaint leading to consultation is: as above     Visit type: audiovisual    Face to Face time with patient: 20  25  minutes of total time spent on the encounter, which includes face to face time and non-face to face time preparing to see the patient (eg, review of tests), Obtaining and/or reviewing separately obtained history, Documenting clinical information in the electronic or other health record, Independently interpreting results (not separately reported) and communicating results to the patient/family/caregiver, or Care coordination (not separately reported).         Each patient to whom he or she provides medical services by telemedicine is:  (1) informed of the relationship between the physician and patient and the respective role of any other health care provider with respect to management of the patient; and (2) notified that he or she may decline to receive medical services by telemedicine and may withdraw from such care at any time.    Notes:         HPI : Madeleine Sheikh is a pleasant 50 -year-old  woman seen in office today in f/u  for above medical problems .  I saw her in clinic about 12 months ago.  Recent labs reviewed and discussed with the patient.  Serum creatinine is stable at 1.1 mg/dL, no recent hospitalizations or ER visits, had shingles in 6/2020 , improved with treatment.         Has longstanding history of type 2 diabetes and hypertension.   she denies recent NSAID use.  She has long history of migraine headaches in closely followed by Neurology Department.  She also has history of fibromyalgia and used to see Rheumatology in the past.             Past Medical History:   Diagnosis Date    CKD (chronic kidney disease) stage 3,  GFR 30-59 ml/min     Diabetes mellitus     Hypertension     Migraine        Current Outpatient Medications on File Prior to Visit   Medication Sig Dispense Refill    ALPRAZolam (XANAX) 0.5 MG tablet Take 1 tablet by mouth once daily.      amitriptyline (ELAVIL) 100 MG tablet TAKE 1 TABLET EVERY DAY      biotin 10,000 mcg Cap       cholecalciferol, vitamin D3, 4,000 unit Cap 2 capsules every day by oral route.      DULoxetine (CYMBALTA) 60 MG capsule Take 1 capsule every day by oral route at bedtime for 90 days.      erenumab-aooe (AIMOVIG AUTOINJECTOR SUBQ) every 30 days.       folic acid 0.8 mg Cap       lamoTRIgine (LAMICTAL) 100 MG tablet       levothyroxine (SYNTHROID) 88 MCG tablet Take 1 tablet by mouth once daily.      m-vit,tx,iron,mins/calc/folic (MULTIVITAMIN) Tab       OZEMPIC 1 mg/0.75 mL (2 mg/1.5 mL) PnIj       pantoprazole (PROTONIX) 40 MG tablet Take 1 tablet every day by oral route for 90 days.      rizatriptan (MAXALT) 10 MG tablet TAKE 1 PILL @ START OF MIGRAINE MAY REPEAT IN 2 HOURS IF NEEDED. MAX OF 2 PILLS IN 24 HOURS      traMADol (ULTRAM) 50 mg tablet Take 1 tablet every 6 hours by oral route as needed.      triamterene-hydrochlorothiazide 37.5-25 mg (MAXZIDE-25) 37.5-25 mg per tablet Take 1 tablet by mouth every other day.      TROKENDI XR 50 mg Cp24 Take 1 tablet by mouth once daily.       No current facility-administered medications on file prior to visit.        Review of Systems  :       Constitutional: Negative for activity change, appetite change, fatigue and fever.   HENT: Negative for congestion, facial swelling, sore throat, trouble swallowing and voice change.    Eyes: Negative for redness and visual disturbance.   Respiratory: Negative for apnea, cough, chest tightness, shortness of breath and wheezing.    Cardiovascular: Negative for chest pain, palpitations and leg swelling.   Gastrointestinal: Negative for abdominal distention, abdominal pain, blood in  stool, constipation, diarrhea, nausea and vomiting.   Genitourinary: Negative for decreased urine volume, difficulty urinating, dysuria, flank pain, frequency, hematuria, pelvic pain and urgency.   Musculoskeletal: Positive for arthralgias. Negative for back pain, gait problem and joint swelling.   Skin: Negative for color change and rash.   Neurological: Negative for dizziness, syncope, weakness and headaches.   Hematological: Does not bruise/bleed easily.   Psychiatric/Behavioral: Negative for agitation, behavioral problems and confusion. The patient is not nervous/anxious.             Objective:             Physical Exam  :      Due to  COVID-19 quarantine needs  this note was prepared without a physical examination.     direct patient communication occurred via  electronic measures  Objective data ( lab results, imaging studies, medications, etc) were reviewed in detail.          Labs:    Lab Results   Component Value Date    CREATININE 1.1 10/06/2020    BUN 8 10/06/2020     10/06/2020    K 4.1 10/06/2020     10/06/2020    CO2 24 10/06/2020       Lab Results   Component Value Date    WBC 12.22 10/03/2019    HGB 13.9 10/03/2019    HCT 41.4 10/03/2019    MCV 94 10/03/2019     (H) 10/03/2019       Lab Results   Component Value Date    PTH 56.0 10/06/2020    CALCIUM 8.8 10/06/2020    PHOS 3.1 10/06/2020       Lab Results   Component Value Date    ALBUMIN 3.4 (L) 10/06/2020       Impression and Plan :  50  year-old  woman seen via telemedicine  today in f/u for following medical problems     1. CKD stage 2   :  recent serum creatinine is stable at 1.1 mg/dL, again advised patient to avoid NSAID use in future,     2.  Hypertension -   reports good BP control,       3.  Type 2 diabetes - discussed adherence with diet and medications,     4.  obesity  - discussed calorie restriction, daily exercise, has been losing weight      5.    urinalysis reviewed , no proteinuria,  hematuria,  follow,      6. Lytes, mild hyponatremia noted, will monitor    7. Hematuria, had a cystoscopy more than 8 years ago according to patient, was a negative study, will do a renal ultrasound to rule out kidney stones, schedule appointment with Urology for a possible repeat cystoscopy,     Return to clinic in a year or earlier if needed,  more than  25 min of face-to-face time was spent with the patient discussing labs and plan of care,              Francisco Herman MD

## 2020-10-22 ENCOUNTER — OFFICE VISIT (OUTPATIENT)
Dept: UROLOGY | Facility: CLINIC | Age: 50
End: 2020-10-22
Payer: COMMERCIAL

## 2020-10-22 VITALS
DIASTOLIC BLOOD PRESSURE: 76 MMHG | HEART RATE: 70 BPM | TEMPERATURE: 98 F | WEIGHT: 223.13 LBS | BODY MASS INDEX: 40.81 KG/M2 | SYSTOLIC BLOOD PRESSURE: 122 MMHG

## 2020-10-22 DIAGNOSIS — R31.9 HEMATURIA SYNDROME: ICD-10-CM

## 2020-10-22 PROCEDURE — 99204 PR OFFICE/OUTPT VISIT, NEW, LEVL IV, 45-59 MIN: ICD-10-PCS | Mod: S$GLB,,, | Performed by: UROLOGY

## 2020-10-22 PROCEDURE — 99999 PR PBB SHADOW E&M-EST. PATIENT-LVL IV: ICD-10-PCS | Mod: PBBFAC,,, | Performed by: UROLOGY

## 2020-10-22 PROCEDURE — 99204 OFFICE O/P NEW MOD 45 MIN: CPT | Mod: S$GLB,,, | Performed by: UROLOGY

## 2020-10-22 PROCEDURE — 99999 PR PBB SHADOW E&M-EST. PATIENT-LVL IV: CPT | Mod: PBBFAC,,, | Performed by: UROLOGY

## 2020-10-22 NOTE — LETTER
October 22, 2020      Francisco Herman MD  [address]  [place] [address]           [facility]  [address]  [place] [address]  Phone: [phone]  Fax: [phone]          Patient: Madeleine Calderon   MR Number: [id number]   YOB: 1970   Date of Visit: 10/22/2020       Dear Dr. Francisco Herman:    Thank you for referring Madeleine Calderon to me for evaluation. Attached you will find relevant portions of my assessment and plan of care.    If you have questions, please do not hesitate to call me. I look forward to following Madeleine Calderon along with you.    Sincerely,    Dionicio Vallejo MD    Enclosure  CC:  No Recipients    If you would like to receive this communication electronically, please contact [email or web address] or [phone] to request more information on [email or web address] access.    For providers and/or their staff who would like to refer a patient to [facility], please contact us through our one-stop-shop provider referral line, [facility], at [phone].    If you feel you have received this communication in error or would no longer like to receive these types of communications, please e-mail [email or web address]

## 2020-10-22 NOTE — PROGRESS NOTES
Chief Complaint:  Microscopic hematuria    HPI:   Madeleine Calderon is a 50 y.o. female that presents today as a referral from Dr. Herman for microscopic hematuria.  Patient reports that she has a lengthy history of microscopic hematuria about 20 years in length.  She states that she has undergone a workup for this twice in the past, the last time was about 10 years ago which was negative for concerning findings.  Patient does have a prior microscopic hematuria with 15-18 red blood cells noted about 13 years ago.  Her last UA was about 2 weeks ago which showed 16 red blood cells per high-power field.  She has never had gross hematuria.  She is a chronic smoker about 1/2 pack per day for about 20 years.  She confirms some stress incontinence but does not use any pads and it does not bother her.  She denies history of stones.    PMH:  Past Medical History:   Diagnosis Date    CKD (chronic kidney disease) stage 3, GFR 30-59 ml/min     Diabetes mellitus     Hypertension     Migraine        PSH:  Past Surgical History:   Procedure Laterality Date    denture implants N/A        Family History:  Denies a history of kidney cancer, bladder cancer, prostate cancer    Social History:  Social History     Tobacco Use    Smoking status: Current Every Day Smoker    Smokeless tobacco: Never Used   Substance Use Topics    Alcohol use: No     Frequency: Never    Drug use: Never       General: No fever, chills  Skin: No rashes  Chest: cough and sputum production  Heart: Denies chest pain  Resp: Denies dyspnea  Abdomen: Denies diarrhea, abdominal pain, hematemesis, or blood in stool.  Musculoskeletal: No joint stiffness or swelling. Denies back pain.  : see HPI  Neuro: no dizziness or weakness    Allergies:  Amlodipine, Augmentin [amoxicillin-pot clavulanate], Azithromycin, Doxycycline, Morphine, Sumatriptan, Tioconazole, and Varenicline    Medications:    Current Outpatient Medications:     ALPRAZolam (XANAX)  0.5 MG tablet, Take 1 tablet by mouth once daily., Disp: , Rfl:     biotin 10,000 mcg Cap, , Disp: , Rfl:     cholecalciferol, vitamin D3, 4,000 unit Cap, 2 capsules every day by oral route., Disp: , Rfl:     DULoxetine (CYMBALTA) 60 MG capsule, Take 1 capsule every day by oral route at bedtime for 90 days., Disp: , Rfl:     erenumab-aooe (AIMOVIG AUTOINJECTOR SUBQ), every 30 days. , Disp: , Rfl:     folic acid 0.8 mg Cap, , Disp: , Rfl:     hydroCHLOROthiazide (HYDRODIURIL) 12.5 MG Tab, Take 1 tablet (12.5 mg total) by mouth once daily., Disp: 30 tablet, Rfl: 11    lamoTRIgine (LAMICTAL) 100 MG tablet, , Disp: , Rfl:     levothyroxine (SYNTHROID) 88 MCG tablet, Take 1 tablet by mouth once daily., Disp: , Rfl:     m-vit,tx,iron,mins/calc/folic (MULTIVITAMIN) Tab, , Disp: , Rfl:     OZEMPIC 1 mg/0.75 mL (2 mg/1.5 mL) PnIj, , Disp: , Rfl:     pantoprazole (PROTONIX) 40 MG tablet, Take 1 tablet every day by oral route for 90 days., Disp: , Rfl:     rizatriptan (MAXALT) 10 MG tablet, TAKE 1 PILL @ START OF MIGRAINE MAY REPEAT IN 2 HOURS IF NEEDED. MAX OF 2 PILLS IN 24 HOURS, Disp: , Rfl:     traMADol (ULTRAM) 50 mg tablet, Take 1 tablet every 6 hours by oral route as needed., Disp: , Rfl:     TROKENDI XR 50 mg Cp24, Take 1 tablet by mouth once daily., Disp: , Rfl:     Physical Exam:  Vitals:    10/22/20 1550   BP: 122/76   Pulse: 70   Temp: 97.5 °F (36.4 °C)     General: awake, alert, cooperative  Head: NC/AT  Ears: external ears normal  Eyes: sclera normal  Lungs: normal inspiration, NAD  Heart: well-perfused  Abdomen: Soft, NT, ND  Skin: The skin is warm and dry.  Ext: No c/c/e.    RADIOLOGY:  No recent relevant imaging available for review.    LABS:  I personally reviewed the following lab values:  Lab Results   Component Value Date    WBC 12.22 10/03/2019    HGB 13.9 10/03/2019    HCT 41.4 10/03/2019     (H) 10/03/2019     10/06/2020    K 4.1 10/06/2020     10/06/2020    CREATININE  1.1 10/06/2020    BUN 8 10/06/2020    CO2 24 10/06/2020    TSH 3.0 05/24/2004    INR 1.1 01/03/2005    CHOL 181 09/22/2005    TRIG 82 09/22/2005    HDL 69.0 (H) 09/22/2005    ALT 13 02/12/2007    AST 18 02/12/2007       URINALYSIS:   Urinalysis performed in clinic today with specific gravity 1.005 pH 6 3+ blood negative for all other parameters    Assessment/Plan:   Madeleine Calderon is a 50 y.o. female with I reviewed the etiology of microscopic hematuria including benign conditions such as bladder and kidney stones, bladder and kidney infections, renal cysts, and urethral stricture disease, but also includes malignant conditions like  bladder cancer and renal cell carcinoma.  I explained the risk stratification algorithm from the AUA Microscopic Hematuria Guidelines 2020 and noted that she would be classified as intermediate risk based on smoking history.  I reviewed the workup for microscopic hematuria including a cystoscopy and upper tract imaging, which includes a renal ultrasound or CT urogram, in properly selected patients.  Her PCP as already ordered a renal ultrasound, we will follow this up, we will have the patient return to clinic for an office cystoscopy.    Thank you for allowing me the opportunity to participate in this patient's care.       Dionicio Vallejo MD  Urology

## 2020-10-27 ENCOUNTER — TELEPHONE (OUTPATIENT)
Dept: RADIOLOGY | Facility: HOSPITAL | Age: 50
End: 2020-10-27

## 2020-10-28 ENCOUNTER — HOSPITAL ENCOUNTER (OUTPATIENT)
Dept: RADIOLOGY | Facility: HOSPITAL | Age: 50
Discharge: HOME | End: 2020-10-28
Attending: INTERNAL MEDICINE
Payer: COMMERCIAL

## 2020-10-28 DIAGNOSIS — N18.2 CHRONIC KIDNEY DISEASE (CKD), STAGE II (MILD): ICD-10-CM

## 2020-10-28 PROCEDURE — 76770 US EXAM ABDO BACK WALL COMP: CPT | Mod: TC

## 2020-10-28 PROCEDURE — 76770 US EXAM ABDO BACK WALL COMP: CPT | Mod: 26,,, | Performed by: RADIOLOGY

## 2020-10-28 PROCEDURE — 76770 US RETROPERITONEAL COMPLETE: ICD-10-PCS | Mod: 26,,, | Performed by: RADIOLOGY

## 2020-11-05 ENCOUNTER — OFFICE VISIT (OUTPATIENT)
Dept: UROLOGY | Facility: CLINIC | Age: 50
End: 2020-11-05
Payer: COMMERCIAL

## 2020-11-05 VITALS — DIASTOLIC BLOOD PRESSURE: 86 MMHG | TEMPERATURE: 98 F | SYSTOLIC BLOOD PRESSURE: 132 MMHG

## 2020-11-05 DIAGNOSIS — R31.29 MICROHEMATURIA: Primary | ICD-10-CM

## 2020-11-05 PROCEDURE — 99999 PR PBB SHADOW E&M-EST. PATIENT-LVL III: ICD-10-PCS | Mod: PBBFAC,,, | Performed by: UROLOGY

## 2020-11-05 PROCEDURE — 99499 UNLISTED E&M SERVICE: CPT | Mod: S$GLB,,, | Performed by: UROLOGY

## 2020-11-05 PROCEDURE — 99999 PR PBB SHADOW E&M-EST. PATIENT-LVL III: CPT | Mod: PBBFAC,,, | Performed by: UROLOGY

## 2020-11-05 PROCEDURE — 99499 NO LOS: ICD-10-PCS | Mod: S$GLB,,, | Performed by: UROLOGY

## 2020-11-05 PROCEDURE — 52000 PR CYSTOURETHROSCOPY: ICD-10-PCS | Mod: S$GLB,,, | Performed by: UROLOGY

## 2020-11-05 PROCEDURE — 52000 CYSTOURETHROSCOPY: CPT | Mod: S$GLB,,, | Performed by: UROLOGY

## 2020-11-05 NOTE — PROCEDURES
Procedures     Procedure:  Diagnostic Cystoscopy    UA: normal, see lab results for values    Procedure in Detail: After proper consents were obtained, the patient was prepped and draped in normal sterile fashion for diagnostic cystoscopy. 5 ml of lidocaine jelly was instilled in the urethra. The flexible cystoscope was then introduced into the urethra, and advanced into the bladder under direct vision. TThe sphincter appeared to be normal. The bladder neck was normal. Inspection of the interior of the bladder was then carried out. The trigone was unremarkable, with no mucosal lesions. The ureteral orifices were normal in position and configuration. Systematic inspection of the mucosa of the bladder it was then carried out, rotating the cystoscope so that all areas of the left and right lateral walls, the dome of the bladder, and the posterior wall were all visualized. The cystoscope was then advanced further into the bladder, and maximum deflection of the scope was performed so that the bladder neck could be inspected. No mucosal lesions were noted there. The cystoscope was then removed, and the procedure terminated. Patient tolerated the procedure well. No complications.     Findings: normal bladder mucosa, no lesions    Disposition:  - negative microhematuria work-up, no need for repeat work-up unless patient has significant increase in the number of RBCs/hpf  - f/u PRN

## 2021-04-28 ENCOUNTER — PATIENT MESSAGE (OUTPATIENT)
Dept: RESEARCH | Facility: HOSPITAL | Age: 51
End: 2021-04-28

## 2021-11-09 ENCOUNTER — TELEPHONE (OUTPATIENT)
Dept: ORTHOPEDICS | Facility: CLINIC | Age: 51
End: 2021-11-09
Payer: COMMERCIAL

## 2021-11-09 DIAGNOSIS — M25.562 LEFT KNEE PAIN, UNSPECIFIED CHRONICITY: Primary | ICD-10-CM

## 2021-11-11 ENCOUNTER — OFFICE VISIT (OUTPATIENT)
Dept: ORTHOPEDICS | Facility: CLINIC | Age: 51
End: 2021-11-11
Payer: COMMERCIAL

## 2021-11-11 ENCOUNTER — HOSPITAL ENCOUNTER (OUTPATIENT)
Dept: RADIOLOGY | Facility: HOSPITAL | Age: 51
Discharge: HOME | End: 2021-11-11
Attending: ORTHOPAEDIC SURGERY
Payer: COMMERCIAL

## 2021-11-11 VITALS — BODY MASS INDEX: 35.26 KG/M2 | WEIGHT: 211.63 LBS | HEIGHT: 65 IN

## 2021-11-11 DIAGNOSIS — S80.02XA CONTUSION OF LEFT KNEE, INITIAL ENCOUNTER: ICD-10-CM

## 2021-11-11 DIAGNOSIS — M25.562 LEFT KNEE PAIN, UNSPECIFIED CHRONICITY: ICD-10-CM

## 2021-11-11 DIAGNOSIS — M17.12 PRIMARY OSTEOARTHRITIS OF LEFT KNEE: Primary | ICD-10-CM

## 2021-11-11 PROCEDURE — 73564 X-RAY EXAM KNEE 4 OR MORE: CPT | Mod: TC,LT

## 2021-11-11 PROCEDURE — 73564 X-RAY EXAM KNEE 4 OR MORE: CPT | Mod: 26,LT,, | Performed by: RADIOLOGY

## 2021-11-11 PROCEDURE — 99999 PR PBB SHADOW E&M-EST. PATIENT-LVL III: ICD-10-PCS | Mod: PBBFAC,,, | Performed by: ORTHOPAEDIC SURGERY

## 2021-11-11 PROCEDURE — 73562 XR KNEE ORTHO LEFT WITH FLEXION: ICD-10-PCS | Mod: 26,RT,, | Performed by: RADIOLOGY

## 2021-11-11 PROCEDURE — 99204 OFFICE O/P NEW MOD 45 MIN: CPT | Mod: S$GLB,,, | Performed by: ORTHOPAEDIC SURGERY

## 2021-11-11 PROCEDURE — 99204 PR OFFICE/OUTPT VISIT, NEW, LEVL IV, 45-59 MIN: ICD-10-PCS | Mod: S$GLB,,, | Performed by: ORTHOPAEDIC SURGERY

## 2021-11-11 PROCEDURE — 99999 PR PBB SHADOW E&M-EST. PATIENT-LVL III: CPT | Mod: PBBFAC,,, | Performed by: ORTHOPAEDIC SURGERY

## 2021-11-11 PROCEDURE — 73562 X-RAY EXAM OF KNEE 3: CPT | Mod: 26,RT,, | Performed by: RADIOLOGY

## 2021-11-11 PROCEDURE — 73564 XR KNEE ORTHO LEFT WITH FLEXION: ICD-10-PCS | Mod: 26,LT,, | Performed by: RADIOLOGY

## 2021-11-11 NOTE — PROGRESS NOTES
Subjective:     HPI:   Madeleine Calderon is a 51 y.o. female who presents for eval L knee    Left knee arthroscopy (meniscus debridement) in  at [facility] Dr Addison SHARMA    She has had about a month of left knee pain intermittent symptoms crepitus minimal pain.  Month ago she fell when she evacuated her mom to [place] with her a cane she missed a step she landed on her left knee she had a bruise there for at least a month.  She has some stabbing pain and some give-way symptoms    Medications: Tylenol a few x's/week, prn tramadol for fibro (#90/6 months from PCP, but not on database), no nsaids due to migraines    Injections: CSI B knee     Physical Therapy: None. The patient did a physician guided PT at home after her left knee arthroscopy.     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 The patient stated that she would stay by her mother in [place] and she would be able to help take care of them if they were to have surgery.     Going to a wedding in  in [place]       ROS:  The updated medical history is in the chart and has been reviewed. A review of systems is updated and there is no reported vision changes, ear/nose/mouth/throat complaints,  chest pain, shortness of breath, abdominal pain, urological complaints, fevers or chills, psychiatric complaints. Musculoskeletal and neurologcial symptoms are as documented. All other systems are negative.      Objective:   Exam:  There were no vitals filed for this visit.  Body mass index is 35.76 kg/m².    Physical examination included assessment of the patient's general appearance with particular  attention to development, nutrition, body habitus, attention to grooming, and any evidence of distress.  Constitutional: The patient is a well-developed, well-nourished patient in no acute distress.   Cardiovascular: Vascular examination included warmth and capillary refill as well inspection for edema and assessment of pedal pulses. Pulses are palpable and regular.  Musculoskeletal: Gait was assessed as to whether it was steady, non-antalgic, and/or required the use of an assist device. The patient was also asked to walk independently and get onto the examination table.  Skin: The skin was examined for any obvious rashes or lesions in the extremity.  Neurologic: Sensation is intact to light touch in the extremity. The patient has good coordination without hyperreflexia and is alert and oriented to person, place and time and has normal mood and affect.     All of the above were examined and found to be within normal limits except for the following pertinent clinical findings:    No limp nonantalgic gait negative Trendelenburg.  0-125 degree knee range of motion 5° valgus alignment tender palpation medial joint line mild lateral and patellofemoral joint mild effusion knee stable anterior-posterior varus valgus stresses no flexion contracture or extensor lag she has got a resolving contusion at the anterior medial aspect of her knee, no hypersensitivity      Imaging:    KNEE L ARTHRITIS     Indication:  Left knee pain  Exam Ordered: Radiographs of the left knee include a standing anteroposterior view, a standing posterioanterior view, a lateral view in full flexion, and a sunrise view  Details of Examination: Exam shows evidence of joint space narrowing, osteophyte formation, and subchondral sclerosis, all consistent with degenerative arthritis of the knee.  No other significant findings are noted.  Impression:  Degenerative Arthritis, Left Knee    Grade 3-4 neutral      Assessment:       ICD-10-CM ICD-9-CM   1.  Primary osteoarthritis of left knee  M17.12 715.16   2. Contusion of left knee, initial encounter  S80.02XA 924.11      8 allergies including morphine and mult abx but ancef = ok  DM 4.9  CKD  Smoking 2pack/week  DVT 2012, ?related to hospitalization for vomiting, xarelto x6 months, no fam hx, no previous hx  No recent labs     Plan:       The above findings were discussed with patient length. We discussed the risks of conservative versus surgical management knee arthritis. Conservative management consisting of anti-inflammatory medications, glucosamine/chondroitin sulfate, weight loss, physical therapy, activity modification, as well as injections (lubricant versus corticosteroid) was discussed at length. At this point considering the patient's level of activity, pain, and radiographic findings I recommend continued conservative management of the knee arthritis.     The patient was given a handout with treatment strategies for hip and knee joint care prior to surgery from [employer], the [employer].   This included information regarding medications, injections, weight loss, exercise, braces, physical therapy, and alternative therapies.     I explained the potentially adverse gastric, cardiac, and renal effects of NSAIDs and explained that if the patient wishes to take it for longer that the patient should discuss this with their primary care physician to determine if it is safe to do so.    x Tylenol    Aleve   x Voltaren Gel   x [employer] non-op arthritis info    Bursitis info    Total Joint Info   x HEP: [employer] home exercise conditioning program    x PT   x CSI: intra-articular steroid injection    CSI: greater trochanter bursitis    HA: hyaluronic acid injection   X HKB Brace:     Referral:      She had pre-existing underlying osteoarthritis exacerbated by a fall and contusion.  Documented history of chronic kidney disease no recent labs, should discuss NSAID use with primary  care provider    F/u 3 months no xrays for repeat CSI v TKA discussion or PRN    Orders Placed This Encounter   Procedures    Ambulatory referral/consult to Physical/Occupational Therapy     Standing Status:   Future     Number of Occurrences:   1     Standing Expiration Date:   12/11/2022     Referral Priority:   Routine     Referral Type:   Physical Medicine     Referral Reason:   Specialty Services Required     Number of Visits Requested:   1             Past Medical History:   Diagnosis Date    CKD (chronic kidney disease) stage 3, GFR 30-59 ml/min     Diabetes mellitus     Hypertension     Migraine        Past Surgical History:   Procedure Laterality Date    denture implants N/A        History reviewed. No pertinent family history.    Social History     Socioeconomic History    Marital status:    Tobacco Use    Smoking status: Current Every Day Smoker    Smokeless tobacco: Never Used   Substance and Sexual Activity    Alcohol use: No    Drug use: Never

## 2021-11-22 ENCOUNTER — CLINICAL SUPPORT (OUTPATIENT)
Dept: REHABILITATION | Facility: HOSPITAL | Age: 51
End: 2021-11-22
Payer: COMMERCIAL

## 2021-11-22 DIAGNOSIS — M25.662 DECREASED RANGE OF MOTION (ROM) OF LEFT KNEE: ICD-10-CM

## 2021-11-22 DIAGNOSIS — M17.12 PRIMARY OSTEOARTHRITIS OF LEFT KNEE: ICD-10-CM

## 2021-11-22 DIAGNOSIS — S80.02XA CONTUSION OF LEFT KNEE, INITIAL ENCOUNTER: ICD-10-CM

## 2021-11-22 PROCEDURE — 97162 PT EVAL MOD COMPLEX 30 MIN: CPT

## 2021-11-22 PROCEDURE — 97110 THERAPEUTIC EXERCISES: CPT

## 2021-11-30 ENCOUNTER — CLINICAL SUPPORT (OUTPATIENT)
Dept: REHABILITATION | Facility: HOSPITAL | Age: 51
End: 2021-11-30
Payer: COMMERCIAL

## 2021-11-30 DIAGNOSIS — M25.662 DECREASED RANGE OF MOTION (ROM) OF LEFT KNEE: ICD-10-CM

## 2021-11-30 PROCEDURE — 97110 THERAPEUTIC EXERCISES: CPT

## 2021-12-01 ENCOUNTER — CLINICAL SUPPORT (OUTPATIENT)
Dept: REHABILITATION | Facility: HOSPITAL | Age: 51
End: 2021-12-01
Payer: COMMERCIAL

## 2021-12-01 DIAGNOSIS — M25.662 DECREASED RANGE OF MOTION (ROM) OF LEFT KNEE: ICD-10-CM

## 2021-12-01 PROCEDURE — 97112 NEUROMUSCULAR REEDUCATION: CPT | Mod: CQ

## 2021-12-01 PROCEDURE — 97110 THERAPEUTIC EXERCISES: CPT | Mod: CQ

## 2021-12-07 ENCOUNTER — CLINICAL SUPPORT (OUTPATIENT)
Dept: REHABILITATION | Facility: HOSPITAL | Age: 51
End: 2021-12-07
Payer: COMMERCIAL

## 2021-12-07 DIAGNOSIS — M25.662 DECREASED RANGE OF MOTION (ROM) OF LEFT KNEE: ICD-10-CM

## 2021-12-07 PROCEDURE — 97110 THERAPEUTIC EXERCISES: CPT

## 2021-12-14 ENCOUNTER — CLINICAL SUPPORT (OUTPATIENT)
Dept: REHABILITATION | Facility: HOSPITAL | Age: 51
End: 2021-12-14
Payer: COMMERCIAL

## 2021-12-14 DIAGNOSIS — M25.662 DECREASED RANGE OF MOTION (ROM) OF LEFT KNEE: ICD-10-CM

## 2021-12-14 PROCEDURE — 97112 NEUROMUSCULAR REEDUCATION: CPT

## 2021-12-14 PROCEDURE — 97110 THERAPEUTIC EXERCISES: CPT

## 2021-12-17 ENCOUNTER — CLINICAL SUPPORT (OUTPATIENT)
Dept: REHABILITATION | Facility: HOSPITAL | Age: 51
End: 2021-12-17
Payer: COMMERCIAL

## 2021-12-17 DIAGNOSIS — M25.662 DECREASED RANGE OF MOTION (ROM) OF LEFT KNEE: ICD-10-CM

## 2021-12-17 PROCEDURE — 97112 NEUROMUSCULAR REEDUCATION: CPT

## 2021-12-17 PROCEDURE — 97110 THERAPEUTIC EXERCISES: CPT

## 2021-12-23 ENCOUNTER — CLINICAL SUPPORT (OUTPATIENT)
Dept: REHABILITATION | Facility: HOSPITAL | Age: 51
End: 2021-12-23
Payer: COMMERCIAL

## 2021-12-23 DIAGNOSIS — M25.662 DECREASED RANGE OF MOTION (ROM) OF LEFT KNEE: ICD-10-CM

## 2021-12-23 PROCEDURE — 97112 NEUROMUSCULAR REEDUCATION: CPT

## 2021-12-23 PROCEDURE — 97110 THERAPEUTIC EXERCISES: CPT

## 2021-12-28 ENCOUNTER — CLINICAL SUPPORT (OUTPATIENT)
Dept: REHABILITATION | Facility: HOSPITAL | Age: 51
End: 2021-12-28
Payer: COMMERCIAL

## 2021-12-28 DIAGNOSIS — M25.662 DECREASED RANGE OF MOTION (ROM) OF LEFT KNEE: ICD-10-CM

## 2021-12-28 PROCEDURE — 97112 NEUROMUSCULAR REEDUCATION: CPT | Mod: CQ

## 2021-12-28 PROCEDURE — 97110 THERAPEUTIC EXERCISES: CPT | Mod: CQ

## 2021-12-30 ENCOUNTER — CLINICAL SUPPORT (OUTPATIENT)
Dept: REHABILITATION | Facility: HOSPITAL | Age: 51
End: 2021-12-30
Payer: COMMERCIAL

## 2021-12-30 DIAGNOSIS — M25.662 DECREASED RANGE OF MOTION (ROM) OF LEFT KNEE: ICD-10-CM

## 2021-12-30 PROCEDURE — 97110 THERAPEUTIC EXERCISES: CPT | Performed by: PHYSICAL THERAPIST

## 2022-01-04 ENCOUNTER — CLINICAL SUPPORT (OUTPATIENT)
Dept: REHABILITATION | Facility: HOSPITAL | Age: 52
End: 2022-01-04
Payer: COMMERCIAL

## 2022-01-04 DIAGNOSIS — M25.662 DECREASED RANGE OF MOTION (ROM) OF LEFT KNEE: ICD-10-CM

## 2022-01-04 PROCEDURE — 97112 NEUROMUSCULAR REEDUCATION: CPT

## 2022-01-04 PROCEDURE — 97110 THERAPEUTIC EXERCISES: CPT

## 2022-01-04 NOTE — PROGRESS NOTES
[facility]   Physical Therapy Treatment    Name: Madeleine Armas [place]  Clinic Number: [id number]    Therapy Diagnosis:   Encounter Diagnosis   Name Primary?    Decreased range of motion (ROM) of left knee      Physician: Rebel Love III, *    Visit Date: 1/4/2022    Physician Orders: PT Eval and Treat,             eval and treat with modalities: L knee arthritis + medial contusion, 2x/week x6 weeks         Medical Diagnosis from Referral:       M17.12 (ICD-10-CM) - Primary osteoarthritis of left knee   S80.02XA (ICD-10-CM) - Contusion of left knee, initial encounter      Evaluation Date: 11/22/2021  Authorization Period Expiration: 6/1/2022  Plan of Care Expiration: 1/22/2022                    Progress Update: 1/23/2021                        Visit # / Visits authorized: 1/20 (8 / 16 and +1 for eval from 2021)                       FOTO: 2 / 3      PRECAUTIONS: Standard Precautions Fibromyaliga      MD Visit: 2/3/2022-- Total knee planning     PTA Visit #: 0/5     Time In: 1634  Time Out: 1720  Total Billable Time: 46 minutes    SUBJECTIVE     Pt reports: that she is feeling good today. Patient reports that she was on the go all weekend long so she was having some pain but has calmed down since.     Compliance with Hep: Daily  Response to previous treatment: Better  Functional change: 80% of PLOF  - Improvements: less pain to perform more ADLs: walking, standing, endurance, flexiblity  - Challenges: heavy lifting/pushing/pulling    Pain: 0 /10   Worst: 8 /10  Location: L Knee / Hip    OBJECTIVE     Objective Measures updated at progress report unless specified otherwise.    Treatment     Gym/Equipment Access: none  Time to Complete Exercises: increased for cueing    Carmen received the treatments listed below:      Carmen received therapeutic exercises to develop strength, endurance, ROM, flexibility, and posture for (38) minutes including: x = exercises performed     **progress  note testing included in overall time**     TherEx 1/4/2022     ROM/Chondral: Nustep  9 minutes, L3   Chondral: Bike (recumbant) x 6 minutes, L2   Standing Knee Bend     Sitting Piriformis Stretch x Steps, 20s holds x4 ea   Standing Calf Stretch- step x Steps, 20s x4 ea   Standing Hamstring Stretch  Steps, 20sx4 ea   Hip Series- Red band (ankles) x    x Hip Abduction, x 2 min ea (stopped at 1.5 minutes on left)  Hip Extension, x 2 min ea  Sit to Standing From Chair, 2x 12    Long arc quads  2x10 each bilateral   Prone quad stretch  2x1 minute each   Lower Trunk Rotations  2 minutes 3 second holds   Mini squats x 3x8 - 7.5 lb Kettle bell   Lateral Squat walks x Red theraband 3 laps   Monster walks x Red theraband 3 laps                               Plan for Next Visit: Red therabnad        NEUROMUSCULAR RE-EDUCATION ACTIVITIES to improve Balance, Coordination, Kinesthetic, Sense, Proprioception and Posture for 8 minutes.  The following were included:     Intervention 1/4/2022    Single leg balance- AirEx  1 minute each   Tandem stance- AirEx  1 minute each   Balance board x 2 minutes forwards/backwards (1 min hold, 1 min taps)  2 minutes side to side- (1 min hold, 1 min taps)   Step ups with eccentric lowering x x20 reps each LE                    Plan for Next Visit:     PATIENT EDUCATION AND HOME EXERCISES      Education/Self-Care provided:  (included in treatment) minutes   · Patient educated on the impairments noted above and the effects of physical therapy intervention to improve overall condition and QOL.   · Patient was educated on all the above exercise prior/during/after for proper posture, positioning, and execution for safe performance with home exercise program.   · Exercise/Activity modifications:   ? 11/22/2021: EVAL: knee flexion/extension/piriformis stretches     Written Home Exercises Provided: yes. Prefers: Electronic Copies  Exercises were reviewed and Carmen was able to demonstrate them prior to  the end of the session.  Carmen demonstrated good understanding of the education provided. See EMR under Patient Instructions for exercises provided during therapy sessions.    ASSESSMENT     Madeleine tolerated treatment well today with no reports of pain throughout the session, although significant muscular fatigue was present following all banded exercises and step ups with eccentric lowering. Patient will continue to benefit from further strengthening progressions to decrease knee pain and put off surgery for as long as possible. Madeleine was educated on continued progression of PT, expectations for the duration of care, updates on goals set at initial evaluation, and home exercise program.  Handouts were not issued during today's visit with instructions on what exercise to perform up until next visit.     Carmen is progressing well towards her goals.   Pt prognosis is Good.     Pt will continue to benefit from skilled outpatient physical therapy to address the deficits listed in the problem list box on initial evaluation, provide pt/family education and to maximize pt's level of independence in the home and community environment.     Pt's spiritual, cultural and educational needs considered and pt agreeable to plan of care and goals.    Anticipated Barriers for therapy: co-morbidities, chronicity of condition, transportation and occupation          GOALS:  SHORT TERM GOALS: 4 weeks, (12/22/21) 1/4/2022   1. Recent signs and systems trend is improving in order to progress towards LTG's. MET    2. Patient will be independent with HEP in order to further progress and return to maximal function.  MET   3. Pain rating at Worst: 5/10 in order to progress towards increased independence with activity.  PC   4. Patient will be able to correct postural deviations in sitting and standing, to decrease pain and promote postural awareness for injury prevention.   PC      LONG TERM GOALS: 8 weeks, (1/22/22) 1/4/2022   1. Patient  will return to normal ADL, recreational, and work related activities with less pain and limitation.   PC    2. Patient will improve AROM to stated goals in order to return to maximal functional potential.    PC   3. Patient will improve Strength to stated goals of appropriate musculature in order to improve functional independence.    PC   4. Pain Rating at Best: 1/10 to improve Quality of Life.   PC    5. Patient will meet predicted functional outcome (FOTO) score: 41% to increase self-worth & perceived functional ability.  PC  44% today   6. Patient will have met/partially met personal goal of: Pts goals: Pt reported goals are to decrease the pain and build up muscle so she has more mobility and can care for her mother- but mostly importantly to buy time before total knee surgery  PC      PC = progressing/continue  PM= partially met  DC= discontinue    PLAN     Continue Plan of Care (POC) and progress per patient tolerance. See Treatment section for exercise progression.    Daphne Temple, PT, DPT

## 2022-01-07 ENCOUNTER — CLINICAL SUPPORT (OUTPATIENT)
Dept: REHABILITATION | Facility: HOSPITAL | Age: 52
End: 2022-01-07
Payer: COMMERCIAL

## 2022-01-07 DIAGNOSIS — M25.662 DECREASED RANGE OF MOTION (ROM) OF LEFT KNEE: ICD-10-CM

## 2022-01-07 PROCEDURE — 97112 NEUROMUSCULAR REEDUCATION: CPT | Mod: CQ

## 2022-01-07 PROCEDURE — 97110 THERAPEUTIC EXERCISES: CPT | Mod: CQ

## 2022-01-07 NOTE — PROGRESS NOTES
[facility]   Physical Therapy Treatment    Name: Madeleine Armas [place]  Clinic Number: [id number]    Therapy Diagnosis:   Encounter Diagnosis   Name Primary?    Decreased range of motion (ROM) of left knee      Physician: Rebel Love III, *    Visit Date: 1/7/2022    Physician Orders: PT Eval and Treat,             eval and treat with modalities: L knee arthritis + medial contusion, 2x/week x6 weeks         Medical Diagnosis from Referral:       M17.12 (ICD-10-CM) - Primary osteoarthritis of left knee   S80.02XA (ICD-10-CM) - Contusion of left knee, initial encounter      Evaluation Date: 11/22/2021  Authorization Period Expiration: 6/1/2022  Plan of Care Expiration: 1/22/2022                    Progress Update: 1/23/2021                        Visit # / Visits authorized: 2/20 (8 / 16 and +1 for eval from 2021)                       FOTO: 2 / 3      PRECAUTIONS: Standard Precautions Fibromyaliga      MD Visit: 2/3/2022-- Total knee planning     PTA Visit #: 1/5     Time In: 1630  Time Out: 1713  Total Billable Time: 40 minutes    SUBJECTIVE     Pt reports: not having any pain today. She was sore after last visit. She states her right knee will bother her on and off though, may be related to weather changes.     Compliance with Hep: Daily  Response to previous treatment: Better  Functional change: 80% of PLOF  - Improvements: less pain to perform more ADLs: walking, standing, endurance, flexiblity  - Challenges: heavy lifting/pushing/pulling    Pain: 0 /10   Worst: 8 /10  Location: L Knee / Hip    OBJECTIVE     Objective Measures updated at progress report unless specified otherwise.    Treatment     Gym/Equipment Access: none  Time to Complete Exercises: increased for cueing    Carmen received the treatments listed below:      Carmen received therapeutic exercises to develop strength, endurance, ROM, flexibility, and posture for (30) minutes including: x = exercises performed      **progress note testing included in overall time**     TherEx 1/7/2022     ROM/Chondral: Nustep  9 minutes, L3   Chondral: Bike (recumbant) x 5 minutes, L2   Standing Knee Bend     Sitting Piriformis Stretch x Steps, 20s holds x4 ea   Standing Calf Stretch- step x Steps, 20s x4 ea   Standing Hamstring Stretch x Steps, 20sx4 ea   Hip Series- Red band (ankles) X  x     Hip Abduction, x 1 min ea   Hip Extension, x 1 min ea  Sit to Standing From Chair, 2x 12    Long arc quads  2x10 each bilateral   Prone quad stretch  2x1 minute each   Lower Trunk Rotations  2 minutes 3 second holds   Mini squats x 3x8 - 10 lb Kettle bell   Lateral Squat walks x Red theraband 5 laps parallel bars   Monster walks x Red theraband 5 laps parallel bars                               Plan for Next Visit: Red therabnad        NEUROMUSCULAR RE-EDUCATION ACTIVITIES to improve Balance, Coordination, Kinesthetic, Sense, Proprioception and Posture for 10 minutes.  The following were included:     Intervention 1/7/2022    Single leg balance- [place] wobble board x 2 x 30s each bilateral   Tandem stance- AirEx  1 minute each   Balance board  2 minutes forwards/backwards (1 min hold, 1 min taps)  2 minutes side to side- (1 min hold, 1 min taps)   Step ups with eccentric lowering x x20 reps each LE                    Plan for Next Visit:     PATIENT EDUCATION AND HOME EXERCISES      Education/Self-Care provided:  (included in treatment) minutes   · Patient educated on the impairments noted above and the effects of physical therapy intervention to improve overall condition and QOL.   · Patient was educated on all the above exercise prior/during/after for proper posture, positioning, and execution for safe performance with home exercise program.   · Exercise/Activity modifications:   ? 11/22/2021: EVAL: knee flexion/extension/piriformis stretches     Written Home Exercises Provided: yes. Prefers: Electronic Copies  Exercises were reviewed and Carmen was  able to demonstrate them prior to the end of the session.  Carmen demonstrated good understanding of the education provided. See EMR under Patient Instructions for exercises provided during therapy sessions.    ASSESSMENT     Madeleine tolerated treatment well today with no reports of pain throughout the session. Noted improved endurance during treatment compared to previous sessions. Added [place] wobble board single leg balance, patient challenged but able to perform with no issues. Patient left session with no reports of increased pain. Handouts were not issued during today's visit with instructions on what exercise to perform up until next visit.     Carmen is progressing well towards her goals.   Pt prognosis is Good.     Pt will continue to benefit from skilled outpatient physical therapy to address the deficits listed in the problem list box on initial evaluation, provide pt/family education and to maximize pt's level of independence in the home and community environment.     Pt's spiritual, cultural and educational needs considered and pt agreeable to plan of care and goals.    Anticipated Barriers for therapy: co-morbidities, chronicity of condition, transportation and occupation          GOALS:  SHORT TERM GOALS: 4 weeks, (12/22/21) 1/7/2022   1. Recent signs and systems trend is improving in order to progress towards LTG's. MET    2. Patient will be independent with HEP in order to further progress and return to maximal function.  MET   3. Pain rating at Worst: 5/10 in order to progress towards increased independence with activity.  PC   4. Patient will be able to correct postural deviations in sitting and standing, to decrease pain and promote postural awareness for injury prevention.   PC      LONG TERM GOALS: 8 weeks, (1/22/22) 1/7/2022   1. Patient will return to normal ADL, recreational, and work related activities with less pain and limitation.   PC    2. Patient will improve AROM to stated goals in  order to return to maximal functional potential.    PC   3. Patient will improve Strength to stated goals of appropriate musculature in order to improve functional independence.    PC   4. Pain Rating at Best: 1/10 to improve Quality of Life.   PC    5. Patient will meet predicted functional outcome (FOTO) score: 41% to increase self-worth & perceived functional ability.  PC  44% today   6. Patient will have met/partially met personal goal of: Pts goals: Pt reported goals are to decrease the pain and build up muscle so she has more mobility and can care for her mother- but mostly importantly to buy time before total knee surgery  PC      PC = progressing/continue  PM= partially met  DC= discontinue    PLAN     Continue Plan of Care (POC) and progress per patient tolerance. See Treatment section for exercise progression.    Patricia Monterroso, PTA, DPT

## 2022-01-18 ENCOUNTER — CLINICAL SUPPORT (OUTPATIENT)
Dept: REHABILITATION | Facility: HOSPITAL | Age: 52
End: 2022-01-18
Payer: COMMERCIAL

## 2022-01-18 DIAGNOSIS — M25.662 DECREASED RANGE OF MOTION (ROM) OF LEFT KNEE: ICD-10-CM

## 2022-01-18 PROCEDURE — 97110 THERAPEUTIC EXERCISES: CPT

## 2022-01-18 NOTE — PROGRESS NOTES
[facility]   Physical Therapy Treatment    Name: Madeleine Armas [place]  Clinic Number: [id number]    Therapy Diagnosis:   Encounter Diagnosis   Name Primary?    Decreased range of motion (ROM) of left knee      Physician: Rebel Love III, *    Visit Date: 1/18/2022    Physician Orders: PT Eval and Treat,             eval and treat with modalities: L knee arthritis + medial contusion, 2x/week x6 weeks         Medical Diagnosis from Referral:       M17.12 (ICD-10-CM) - Primary osteoarthritis of left knee   S80.02XA (ICD-10-CM) - Contusion of left knee, initial encounter      Evaluation Date: 11/22/2021  Authorization Period Expiration: 6/1/2022  Plan of Care Expiration: 1/22/2022                    Progress Update: 1/23/2021                        Visit # / Visits authorized: 3 /20 (8 / 16 and +1 for eval from 2021)                       FOTO: 2 / 3      PRECAUTIONS: Standard Precautions Fibromyaliga      MD Visit: 2/3/2022-- Total knee planning     PTA Visit #: 0/5     Time In: 1625- late arrival  Time Out: 1700  Total Billable Time: 35 minutes    SUBJECTIVE     Pt reports:  She was in a lot of pain last week, and that is why she did not come and cancelled.  She reports that she had her wheels and rims stollen of her car this morning, and wanted to still come so that she did not miss too many.  She has one more visit this week and then she is moving away for a while, therefore today and later this week is her lasts apts.   Compliance with Hep: Not performed  Response to previous treatment: no adverse reactions to treatment/updated HEP  Functional change: Better    Pain: 0 /10   Worst: 8 /10  Location: L Knee / Hip    OBJECTIVE     Objective Measures updated at progress report unless specified otherwise.    Treatment     Gym/Equipment Access: none  Time to Complete Exercises: increased for cueing    Carmen received the treatments listed below:      Carmen received therapeutic  exercises to develop strength, endurance, ROM, flexibility, and posture for (30) minutes including: x = exercises performed     TherEx 1/18/2022     ROM/Chondral: Nustep  9 minutes, L3   Chondral: Bike (recumbant) x 5 minutes, L2   Standing Knee Bend     Sitting Piriformis Stretch x Steps, 20s holds x4 ea   Standing Calf Stretch- step x Steps, 20s x4 ea   Standing Hamstring Stretch x Steps, 20sx4 ea   Hip Series- Red band (ankles)      Hip Abduction, x 1 min ea   Hip Extension, x 1 min ea  Sit to Standing From Chair, 2x 12    Long arc quads  2x10 each bilateral   Prone quad stretch  2x1 minute each   Lower Trunk Rotations  2 minutes 3 second holds   Mini squats x 3x10 - green Therband knees- [place]   Lateral Squat walks x Green theraband- 2 min   Monster walks  Red theraband 5 laps parallel bars   Hip Extension x Green Theraband                          Plan for Next Visit: Red therabnad        NEUROMUSCULAR RE-EDUCATION ACTIVITIES to improve Balance, Coordination, Kinesthetic, Sense, Proprioception and Posture for (0) minutes.  The following were included:     Intervention 1/18/2022    Single leg balance- [place] wobble board x 2 x 30s each bilateral   Tandem stance- AirEx  1 minute each   Balance board  2 minutes forwards/backwards (1 min hold, 1 min taps)  2 minutes side to side- (1 min hold, 1 min taps)   Step ups with eccentric lowering x x20 reps each LE                    Plan for Next Visit:     PATIENT EDUCATION AND HOME EXERCISES      Education/Self-Care provided:  (included in treatment) minutes   · Patient educated on the impairments noted above and the effects of physical therapy intervention to improve overall condition and QOL.   · Patient was educated on all the above exercise prior/during/after for proper posture, positioning, and execution for safe performance with home exercise program.   · Exercise/Activity modifications:   ? 11/22/2021: EVAL: knee flexion/extension/piriformis stretches     Written  Home Exercises Provided: yes. Prefers: Electronic Copies  Exercises were reviewed and Carmen was able to demonstrate them prior to the end of the session.  Carmen demonstrated good understanding of the education provided. See EMR under Patient Instructions for exercises provided during therapy sessions.    ASSESSMENT     Carmen Calderon tolerated PT session well with minimal  complaints of pain or discomfort, secondary to knee discomfort. Objective findings show no change with of range of motion and functional mobility.  Therapy exercises were reviewed by revisiting exercises given from previous home exercise program while adding miniband exercises with green theraband.  Handouts were issued during today's visit. Madeleine demonstrated good understanding of new exercises and will continue to progress at home until next follow-up.      Carmen is progressing well towards her goals.   Pt prognosis is Good.     Pt will continue to benefit from skilled outpatient physical therapy to address the deficits listed in the problem list box on initial evaluation, provide pt/family education and to maximize pt's level of independence in the home and community environment.     Pt's spiritual, cultural and educational needs considered and pt agreeable to plan of care and goals.    Anticipated Barriers for therapy: co-morbidities, chronicity of condition, transportation and occupation          GOALS:  SHORT TERM GOALS: 4 weeks, (12/22/21) 1/18/2022   1. Recent signs and systems trend is improving in order to progress towards LTG's. MET    2. Patient will be independent with HEP in order to further progress and return to maximal function.  MET   3. Pain rating at Worst: 5/10 in order to progress towards increased independence with activity.  PC   4. Patient will be able to correct postural deviations in sitting and standing, to decrease pain and promote postural awareness for injury prevention.   PC      LONG TERM GOALS: 8 weeks,  (1/22/22) 1/18/2022   1. Patient will return to normal ADL, recreational, and work related activities with less pain and limitation.   PC    2. Patient will improve AROM to stated goals in order to return to maximal functional potential.    PC   3. Patient will improve Strength to stated goals of appropriate musculature in order to improve functional independence.    PC   4. Pain Rating at Best: 1/10 to improve Quality of Life.   PC    5. Patient will meet predicted functional outcome (FOTO) score: 41% to increase self-worth & perceived functional ability.  PC  44% today   6. Patient will have met/partially met personal goal of: Pts goals: Pt reported goals are to decrease the pain and build up muscle so she has more mobility and can care for her mother- but mostly importantly to buy time before total knee surgery  PC      PC = progressing/continue  PM= partially met  DC= discontinue    PLAN     Continue Plan of Care (POC) and progress per patient tolerance. See Treatment section for exercise progression.    Amy Mims, PT, DPT

## 2022-01-20 ENCOUNTER — CLINICAL SUPPORT (OUTPATIENT)
Dept: REHABILITATION | Facility: HOSPITAL | Age: 52
End: 2022-01-20
Payer: COMMERCIAL

## 2022-01-20 DIAGNOSIS — M25.662 DECREASED RANGE OF MOTION (ROM) OF LEFT KNEE: ICD-10-CM

## 2022-01-20 PROCEDURE — 97110 THERAPEUTIC EXERCISES: CPT

## 2022-01-20 NOTE — PROGRESS NOTES
[facility]   Physical Therapy Treatment + Discharge Note    [facility] Number: [id number]    Therapy Diagnosis:   Encounter Diagnosis   Name Primary?    Decreased range of motion (ROM) of left knee      Physician: Rebel Love III, *    Visit Date: 1/20/2022    Physician Orders: PT Eval and Treat,             eval and treat with modalities: L knee arthritis + medial contusion, 2x/week x6 weeks         Medical Diagnosis from Referral:       M17.12 (ICD-10-CM) - Primary osteoarthritis of left knee   S80.02XA (ICD-10-CM) - Contusion of left knee, initial encounter      Evaluation Date: 11/22/2021  Authorization Period Expiration: 6/1/2022  Plan of Care Expiration: 1/22/2022                    Progress Update: 1/23/2021 (                       Visit # / Visits authorized: 4 /20 (8 / 16 and +1 for eval from 2021)                       FOTO: 2 / 3      PRECAUTIONS: Standard Precautions Fibromyaliga      MD Visit: 2/3/2022-- Total knee planning     PTA Visit #: 0/5     Time In: 1631  Time Out: 1520  Total Billable Time: 47 minutes    SUBJECTIVE     Pt reports: that her knees have been feeling good as she is having no pain, nor has she had any pain this week. Patient feels ready for discharge today.   Compliance with Hep: Every Other Day  Response to previous treatment: no adverse reactions to treatment/updated HEP  Functional change: Better    Pain: 0 /10   Worst: 8 /10 (fibromyalgia pain)  Location: L Knee / Hip    OBJECTIVE     Objective Measures updated at progress report unless specified otherwise.    Objective Measures updated at progress report unless specified otherwise.        Knee AROM/PROM Right  1/20/2022 Left  1/20/2022 Goal   Hyper - Zero - Flexion 3-0-126 5-0-123 0-0-135  Initial:   5-0-113  8-0-93         STRENGTH:     L/E MMT Right  1/20/2022 Goal   Hip Flexion  4/5 5/5 B    Hip Extension  4/5 5/5 B   Hip Abduction  4/5 5/5 B   Hip IR 4/5 5/5 B    Hip ER 4/5 5/5 B   Knee Flexion 4+/5 5/5 B   Knee Extension 4+/5 5/5 B   Ankle DF Not tested 5/5 B   Ankle PF Not tested 5/5 B      L/E MMT Left  1/20/2022 Goal   Hip Flexion  4/5 5/5 B    Hip Extension  4/5 5/5 B   Hip Abduction  4/5 5/5 B   Hip IR 4/5 5/5 B   Hip ER 4/5 5/5 B   Knee Flexion 4+/5 5/5 B   Knee Extension 4+/5 5/5 B   Ankle DF Not tested 5/5 B   Ankle PF Not tested 5/5 B          Treatment     Gym/Equipment Access: none  Time to Complete Exercises: increased for cueing    Carmen received the treatments listed below:      Carmen received therapeutic exercises to develop strength, endurance, ROM, flexibility, and posture for (47) minutes including: x = exercises performed     TherEx 1/20/2022     ROM/Chondral: Nustep  8 minutes, L3   Chondral: Bike (recumbant) x 8 minutes, L3   Standing Knee Bend     Standing Piriformis Stretch x Steps, 20s holds x3 ea   Standing Calf Stretch- step x Steps, 20s x3 ea   Standing Hamstring Stretch x Steps, 20s x3 ea        Long arc quads  2x10 each bilateral   Prone quad stretch  2x1 minute each   Lower Trunk Rotations  2 minutes 3 second holds   Mini squats x 3x10 - green Therband knees- Tball   Lateral Squat walks x Green theraband- 2 min   Monster walks  Red theraband 5 laps parallel bars   Hip Extension x Green Theraband 1 minute B   Hip Abduction  x Green theraband 1 minute B   Re-Assessed Objective Measurements x 10 minutes                 Plan for Next Visit:       NEUROMUSCULAR RE-EDUCATION ACTIVITIES to improve Balance, Coordination, Kinesthetic, Sense, Proprioception and Posture for (0) minutes.  The following were included:     Intervention 1/20/2022    Single leg balance- [place] wobble board x 2 x 30s each bilateral   Tandem stance- AirEx  1 minute each   Balance board  2 minutes forwards/backwards (1 min hold, 1 min taps)  2 minutes side to side- (1 min hold, 1 min taps)   Step ups with eccentric lowering x x20 reps each LE                    Plan for Next  Visit:     PATIENT EDUCATION AND HOME EXERCISES      Education/Self-Care provided:  (included in treatment) minutes   · Patient educated on the impairments noted above and the effects of physical therapy intervention to improve overall condition and QOL.   · Patient was educated on all the above exercise prior/during/after for proper posture, positioning, and execution for safe performance with home exercise program.   · Exercise/Activity modifications:   ? 11/22/2021: EVAL: knee flexion/extension/piriformis stretches     Written Home Exercises Provided: yes. Prefers: Electronic Copies  Exercises were reviewed and Carmen was able to demonstrate them prior to the end of the session.  Carmen demonstrated good understanding of the education provided. See EMR under Patient Instructions for exercises provided during therapy sessions.    ASSESSMENT     Carmen ArmasEricksonKvng tolerated treatment well today going through previous stretches and theraband strengthening exercises to ensure good follow through as we are discharging to home program today. Overall improved strength and range of motion since beginning therapy to allow prolonging of total knee replacement for the time being. Patient will return to clinic in a few months to continue/progress strengthening program once she is back in town. Handouts were not issued during today's visit.    Carmen is progressing well towards her goals.   Pt prognosis is Good.     Pt will continue to benefit from skilled outpatient physical therapy to address the deficits listed in the problem list box on initial evaluation, provide pt/family education and to maximize pt's level of independence in the home and community environment.     Pt's spiritual, cultural and educational needs considered and pt agreeable to plan of care and goals.    Anticipated Barriers for therapy: co-morbidities, chronicity of condition, transportation and occupation     GOALS:  SHORT TERM GOALS: 4 weeks, (12/22/21)  1/20/2022   1. Recent signs and systems trend is improving in order to progress towards LTG's. MET    2. Patient will be independent with HEP in order to further progress and return to maximal function.  MET   3. Pain rating at Worst: 5/10 in order to progress towards increased independence with activity. Met   4. Patient will be able to correct postural deviations in sitting and standing, to decrease pain and promote postural awareness for injury prevention.   Met      LONG TERM GOALS: 8 weeks, (1/22/22) 1/20/2022   1. Patient will return to normal ADL, recreational, and work related activities with less pain and limitation.  Met   2. Patient will improve AROM to stated goals in order to return to maximal functional potential.    Progressed towards   3. Patient will improve Strength to stated goals of appropriate musculature in order to improve functional independence.    Progressed Towards   4. Pain Rating at Best: 1/10 to improve Quality of Life.  Met   5. Patient will meet predicted functional outcome (FOTO) score: 41% to increase self-worth & perceived functional ability.  Met today   6. Patient will have met/partially met personal goal of: Pts goals: Pt reported goals are to decrease the pain and build up muscle so she has more mobility and can care for her mother- but mostly importantly to buy time before total knee surgery  Met      PC = progressing/continue  PM= partially met  DC= discontinue    PLAN     Discharge today to allow patient to continue strengthening and mobility program on her own as her symptoms have significantly improved.     Daphne Temple, PT, DPT

## 2022-01-27 PROBLEM — M25.662 DECREASED RANGE OF MOTION (ROM) OF LEFT KNEE: Status: RESOLVED | Noted: 2021-11-22 | Resolved: 2022-01-27

## 2022-02-02 ENCOUNTER — TELEPHONE (OUTPATIENT)
Dept: ORTHOPEDICS | Facility: CLINIC | Age: 52
End: 2022-02-02
Payer: COMMERCIAL

## 2022-02-02 NOTE — TELEPHONE ENCOUNTER
I called and spoke to the patient to confirm her appointment. The patient has not tested positive for Covid in the past 10 days.

## 2022-02-03 ENCOUNTER — OFFICE VISIT (OUTPATIENT)
Dept: ORTHOPEDICS | Facility: CLINIC | Age: 52
End: 2022-02-03
Payer: COMMERCIAL

## 2022-02-03 VITALS — HEIGHT: 62 IN | WEIGHT: 207 LBS | BODY MASS INDEX: 38.09 KG/M2

## 2022-02-03 DIAGNOSIS — M17.0 ARTHRITIS OF BOTH KNEES: Primary | ICD-10-CM

## 2022-02-03 PROCEDURE — 20610 DRAIN/INJ JOINT/BURSA W/O US: CPT | Mod: 50,S$GLB,, | Performed by: ORTHOPAEDIC SURGERY

## 2022-02-03 PROCEDURE — 99999 PR PBB SHADOW E&M-EST. PATIENT-LVL III: CPT | Mod: PBBFAC,,, | Performed by: ORTHOPAEDIC SURGERY

## 2022-02-03 PROCEDURE — 99999 PR PBB SHADOW E&M-EST. PATIENT-LVL III: ICD-10-PCS | Mod: PBBFAC,,, | Performed by: ORTHOPAEDIC SURGERY

## 2022-02-03 PROCEDURE — 99499 NO LOS: ICD-10-PCS | Mod: S$GLB,,, | Performed by: ORTHOPAEDIC SURGERY

## 2022-02-03 PROCEDURE — 20610 PR DRAIN/INJECT LARGE JOINT/BURSA: ICD-10-PCS | Mod: 50,S$GLB,, | Performed by: ORTHOPAEDIC SURGERY

## 2022-02-03 PROCEDURE — 99499 UNLISTED E&M SERVICE: CPT | Mod: S$GLB,,, | Performed by: ORTHOPAEDIC SURGERY

## 2022-02-03 RX ADMIN — TRIAMCINOLONE ACETONIDE 40 MG: 40 INJECTION, SUSPENSION INTRA-ARTICULAR; INTRAMUSCULAR at 01:02

## 2022-02-03 NOTE — PROGRESS NOTES
Patient returns she had a left knee steroid injection November 11, 2021 she therapy for 8 weeks and she is working on losing weight.    She would like bilateral knee steroid injections today.  She has some  workup ongoing has an endometrial biopsy pending and says her knees can weight.    Exam unchanged    No x-rays today    Bilateral knee arthritis    Bilateral knee steroid injections today    She is going to a wedding in early June in [place], will see her back at the end of May for repeat bilateral knee injections

## 2022-02-09 RX ORDER — TRIAMCINOLONE ACETONIDE 40 MG/ML
40 INJECTION, SUSPENSION INTRA-ARTICULAR; INTRAMUSCULAR
Status: DISCONTINUED | OUTPATIENT
Start: 2022-02-03 | End: 2022-02-09 | Stop reason: HOSPADM

## 2022-02-09 NOTE — PROCEDURES
Large Joint Aspiration/Injection: bilateral knee    Date/Time: 2/3/2022 1:45 PM  Performed by: Rebel Love III, MD  Authorized by: Rebel Love III, MD     Consent Done?:  Yes (Verbal)  Indications:  Pain  Timeout: prior to procedure the correct patient, procedure, and site was verified    Prep: patient was prepped and draped in usual sterile fashion      Local anesthesia used?: Yes    Local anesthetic:  Lidocaine 1% without epinephrine  Anesthetic total (ml):  8 (4mL each knee)      Details:  Needle Size:  21 G  Location:  Knee  Laterality:  Bilateral  Site:  Bilateral knee  Medications (Right):  40 mg triamcinolone acetonide 40 mg/mL  Medications (Left):  40 mg triamcinolone acetonide 40 mg/mL  Patient tolerance:  Patient tolerated the procedure well with no immediate complications

## 2022-05-09 ENCOUNTER — PATIENT MESSAGE (OUTPATIENT)
Dept: ORTHOPEDICS | Facility: CLINIC | Age: 52
End: 2022-05-09
Payer: COMMERCIAL

## 2022-05-09 DIAGNOSIS — M17.12 PRIMARY OSTEOARTHRITIS OF LEFT KNEE: ICD-10-CM

## 2022-05-09 DIAGNOSIS — M17.0 ARTHRITIS OF BOTH KNEES: Primary | ICD-10-CM

## 2022-05-26 ENCOUNTER — OFFICE VISIT (OUTPATIENT)
Dept: ORTHOPEDICS | Facility: CLINIC | Age: 52
End: 2022-05-26
Payer: COMMERCIAL

## 2022-05-26 ENCOUNTER — HOSPITAL ENCOUNTER (OUTPATIENT)
Dept: RADIOLOGY | Facility: HOSPITAL | Age: 52
Discharge: HOME | End: 2022-05-26
Attending: ORTHOPAEDIC SURGERY
Payer: COMMERCIAL

## 2022-05-26 VITALS — HEIGHT: 62 IN | WEIGHT: 207.88 LBS | BODY MASS INDEX: 38.25 KG/M2

## 2022-05-26 DIAGNOSIS — M17.0 ARTHRITIS OF BOTH KNEES: ICD-10-CM

## 2022-05-26 DIAGNOSIS — M17.12 PRIMARY OSTEOARTHRITIS OF LEFT KNEE: ICD-10-CM

## 2022-05-26 DIAGNOSIS — M17.0 ARTHRITIS OF BOTH KNEES: Primary | ICD-10-CM

## 2022-05-26 PROCEDURE — 99999 PR PBB SHADOW E&M-EST. PATIENT-LVL III: ICD-10-PCS | Mod: PBBFAC,,, | Performed by: ORTHOPAEDIC SURGERY

## 2022-05-26 PROCEDURE — 73564 XR KNEE ORTHO BILAT WITH FLEXION: ICD-10-PCS | Mod: 26,50,, | Performed by: RADIOLOGY

## 2022-05-26 PROCEDURE — 99499 UNLISTED E&M SERVICE: CPT | Mod: S$GLB,,, | Performed by: ORTHOPAEDIC SURGERY

## 2022-05-26 PROCEDURE — 99999 PR PBB SHADOW E&M-EST. PATIENT-LVL III: CPT | Mod: PBBFAC,,, | Performed by: ORTHOPAEDIC SURGERY

## 2022-05-26 PROCEDURE — 73564 X-RAY EXAM KNEE 4 OR MORE: CPT | Mod: 26,50,, | Performed by: RADIOLOGY

## 2022-05-26 PROCEDURE — 20610 DRAIN/INJ JOINT/BURSA W/O US: CPT | Mod: 50,S$GLB,, | Performed by: ORTHOPAEDIC SURGERY

## 2022-05-26 PROCEDURE — 99499 NO LOS: ICD-10-PCS | Mod: S$GLB,,, | Performed by: ORTHOPAEDIC SURGERY

## 2022-05-26 PROCEDURE — 20610 LARGE JOINT ASPIRATION/INJECTION: BILATERAL KNEE: ICD-10-PCS | Mod: 50,S$GLB,, | Performed by: ORTHOPAEDIC SURGERY

## 2022-05-26 PROCEDURE — 73564 X-RAY EXAM KNEE 4 OR MORE: CPT | Mod: TC,50

## 2022-05-26 RX ADMIN — TRIAMCINOLONE ACETONIDE 40 MG: 40 INJECTION, SUSPENSION INTRA-ARTICULAR; INTRAMUSCULAR at 01:05

## 2022-05-26 NOTE — PROGRESS NOTES
Injection Information    Triamcinolone Acetonide Injectable Suspension (40mg/mL)  Lot Number: [id number]   Expiration Date: 10/31/2023

## 2022-05-26 NOTE — PROGRESS NOTES
B knee OA  F/u B knee CSI 2/3/22    Requests B knee CSI today    Trips to [place] in June and cruise to [facility] in July    Cervical pathology was negative    New XR: B knee G3 OA    B knee CSI    F/u PRN

## 2022-06-05 RX ORDER — TRIAMCINOLONE ACETONIDE 40 MG/ML
40 INJECTION, SUSPENSION INTRA-ARTICULAR; INTRAMUSCULAR
Status: DISCONTINUED | OUTPATIENT
Start: 2022-05-26 | End: 2022-06-05 | Stop reason: HOSPADM

## 2022-06-05 NOTE — PROCEDURES
Large Joint Aspiration/Injection: bilateral knee    Date/Time: 5/26/2022 1:00 PM  Performed by: Rebel Love III, MD  Authorized by: Rebel Love III, MD     Consent Done?:  Yes (Verbal)  Indications:  Pain  Timeout: prior to procedure the correct patient, procedure, and site was verified    Prep: patient was prepped and draped in usual sterile fashion      Local anesthesia used?: Yes    Local anesthetic:  Lidocaine 1% without epinephrine  Anesthetic total (ml):  8 (4mL each knee)      Details:  Needle Size:  21 G  Location:  Knee  Laterality:  Bilateral  Site:  Bilateral knee  Medications (Right):  40 mg triamcinolone acetonide 40 mg/mL  Medications (Left):  40 mg triamcinolone acetonide 40 mg/mL  Patient tolerance:  Patient tolerated the procedure well with no immediate complications

## 2022-07-15 ENCOUNTER — OFFICE VISIT (OUTPATIENT)
Dept: URGENT CARE | Facility: CLINIC | Age: 52
End: 2022-07-15
Payer: COMMERCIAL

## 2022-07-15 VITALS
TEMPERATURE: 97 F | WEIGHT: 207 LBS | BODY MASS INDEX: 38.09 KG/M2 | DIASTOLIC BLOOD PRESSURE: 83 MMHG | RESPIRATION RATE: 18 BRPM | OXYGEN SATURATION: 96 % | HEART RATE: 82 BPM | HEIGHT: 62 IN | SYSTOLIC BLOOD PRESSURE: 133 MMHG

## 2022-07-15 DIAGNOSIS — J06.9 VIRAL URI: Primary | ICD-10-CM

## 2022-07-15 LAB
CTP QC/QA: YES
SARS-COV-2 RDRP RESP QL NAA+PROBE: NEGATIVE

## 2022-07-15 PROCEDURE — 99203 OFFICE O/P NEW LOW 30 MIN: CPT | Mod: S$GLB,,,

## 2022-07-15 PROCEDURE — U0002: ICD-10-PCS | Mod: QW,S$GLB,,

## 2022-07-15 PROCEDURE — U0002 COVID-19 LAB TEST NON-CDC: HCPCS | Mod: QW,S$GLB,,

## 2022-07-15 PROCEDURE — 99203 PR OFFICE/OUTPT VISIT, NEW, LEVL III, 30-44 MIN: ICD-10-PCS | Mod: S$GLB,,,

## 2022-07-15 RX ORDER — SUCRALFATE 1 G/10ML
SUSPENSION ORAL
COMMUNITY
Start: 2022-06-03 | End: 2023-02-07

## 2022-07-15 RX ORDER — GALCANEZUMAB 120 MG/ML
120 INJECTION, SOLUTION SUBCUTANEOUS
COMMUNITY
Start: 2021-08-11

## 2022-07-15 RX ORDER — MECLIZINE HYDROCHLORIDE 25 MG/1
25 TABLET ORAL 3 TIMES DAILY PRN
COMMUNITY
Start: 2021-08-20 | End: 2023-02-07

## 2022-07-15 RX ORDER — CYANOCOBALAMIN 1000 UG/ML
INJECTION, SOLUTION INTRAMUSCULAR; SUBCUTANEOUS
COMMUNITY
Start: 2022-05-26 | End: 2023-02-07 | Stop reason: SDUPTHER

## 2022-07-15 RX ORDER — LIOTHYRONINE SODIUM 5 UG/1
1 TABLET ORAL 3 TIMES DAILY
COMMUNITY
Start: 2022-07-12 | End: 2023-02-07 | Stop reason: SDUPTHER

## 2022-07-15 RX ORDER — MONTELUKAST SODIUM 10 MG/1
1 TABLET ORAL DAILY
COMMUNITY
Start: 2021-11-16 | End: 2023-02-07

## 2022-07-15 RX ORDER — AZELASTINE HYDROCHLORIDE 0.5 MG/ML
1 SOLUTION/ DROPS OPHTHALMIC 2 TIMES DAILY PRN
COMMUNITY
Start: 2021-11-04

## 2022-07-15 NOTE — PROGRESS NOTES
"Subjective:       Patient ID: Madeleine Sheikh is a 51 y.o. female.    Vitals:  height is 5' 2" (1.575 m) and weight is 93.9 kg (207 lb). Her tympanic temperature is 96.7 °F (35.9 °C). Her blood pressure is 133/83 and her pulse is 82. Her respiration is 18 and oxygen saturation is 96%.     Chief Complaint: Cough    Patient is a 51-year-old female who presents with coughing, postnasal drip, congestion, headaches, runny nose started 3 days ago.  No known sick contacts.  Denies any chest pain, shortness of breath, nausea, vomiting, abdominal pain, diarrhea.    Cough  This is a new problem. Episode onset: 3 days ago. The problem has been gradually worsening. The problem occurs every few minutes. The cough is productive of sputum. Associated symptoms include headaches and postnasal drip. Pertinent negatives include no chest pain, chills, ear pain, fever, myalgias, rash, sore throat, shortness of breath or wheezing. Nothing aggravates the symptoms. She has tried nothing for the symptoms. Her past medical history is significant for bronchitis.       Constitution: Positive for fatigue. Negative for chills and fever.   HENT: Positive for congestion and postnasal drip. Negative for ear pain, ear discharge, sinus pain, sinus pressure and sore throat.    Neck: Negative for neck pain.   Cardiovascular: Negative for chest pain.   Eyes: Negative for eye pain.   Respiratory: Positive for cough and sputum production. Negative for chest tightness, shortness of breath and wheezing.    Gastrointestinal: Negative for abdominal pain, nausea, vomiting and diarrhea.   Musculoskeletal: Negative for muscle ache.   Skin: Negative for rash.   Allergic/Immunologic: Negative for sneezing.   Neurological: Positive for headaches. Negative for dizziness and light-headedness.       Objective:      Physical Exam   Constitutional: She is oriented to person, place, and time. She appears well-developed. She is cooperative.  Non-toxic " appearance. She does not appear ill. No distress.   HENT:   Head: Normocephalic and atraumatic.   Ears:   Right Ear: Hearing, tympanic membrane, external ear and ear canal normal.   Left Ear: Hearing, tympanic membrane, external ear and ear canal normal.   Nose: Rhinorrhea and congestion present. No mucosal edema or nasal deformity. No epistaxis. Right sinus exhibits no maxillary sinus tenderness and no frontal sinus tenderness. Left sinus exhibits no maxillary sinus tenderness and no frontal sinus tenderness.   Mouth/Throat: Uvula is midline, oropharynx is clear and moist and mucous membranes are normal. No trismus in the jaw. Normal dentition. No uvula swelling. No oropharyngeal exudate, posterior oropharyngeal edema or posterior oropharyngeal erythema.   Eyes: Conjunctivae and lids are normal. No scleral icterus.   Neck: Trachea normal and phonation normal. Neck supple. No edema present. No erythema present. No neck rigidity present.   Cardiovascular: Normal rate, regular rhythm, normal heart sounds and normal pulses.   Pulmonary/Chest: Effort normal and breath sounds normal. No respiratory distress. She has no decreased breath sounds. She has no wheezes. She has no rhonchi. She has no rales.   Abdominal: Normal appearance.   Musculoskeletal: Normal range of motion.         General: No deformity. Normal range of motion.   Neurological: no focal deficit. She is alert and oriented to person, place, and time. No cranial nerve deficit. She exhibits normal muscle tone. Coordination normal.   Skin: Skin is warm, dry, intact, not diaphoretic and not pale. Capillary refill takes less than 2 seconds.   Psychiatric: Her speech is normal and behavior is normal. Judgment and thought content normal.   Nursing note and vitals reviewed.          Results for orders placed or performed in visit on 07/15/22   POCT COVID-19 Rapid Screening   Result Value Ref Range    POC Rapid COVID Negative Negative     Acceptable  Yes        Assessment:       1. Viral URI          Plan:         Viral URI  -     POCT COVID-19 Rapid Screening                   Patient Instructions   - Rest.    - Drink plenty of fluids.  - Viral upper respiratory infections typically run their course in 10-14 days.      - You can take over-the-counter claritin, zyrtec, allegra, or xyzal as directed. These are antihistamines that can help with runny nose, nasal congestion, sneezing, and helps to dry up post-nasal drip, which usually causes sore throat and cough.              - If you do NOT have high blood pressure, you may use a decongestant form (D)  of this medication (ie. Claritin- D, zyrtec-D, allegra-D) or if you do not take the D form, you can take sudafed (pseudoephedrine) over the counter, which is a decongestant. Do NOT take two decongestant (D) medications at the same time (such as mucinex-D and claritin-D or plain sudafed and claritin D)    - If you DO have Hypertension, anxiety, or palpitations, it is safe to take Coricidin HBP for relief of sinus symptoms.     - You can use Flonase (fluticasone) nasal spray as directed for sinus congestion and postnasal drip. This is a steroid nasal spray that works locally over time to decrease the inflammation in your nose/sinuses and help with allergic symptoms. This is not an quick- relief spray like afrin, but it works well if used daily.  Discontinue if you develop nose bleed  - use nasal saline prior to Flonase.  - Use Ocean Spray Nasal Saline 1-3 puffs each nostril every 2-3 hours then blow out onto tissue. This is to irrigate the nasal passage way to clear the sinus openings. Use until sinus problem resolved.     - you can take plain Mucinex (guaifenesin) 1200 mg twice a day to help loosen mucous.      -warm salt water gargles can help with sore throat     - warm tea with honey can help with cough. Honey is a natural cough suppressant.     - Dextromethorphan (DM) is a cough suppressant over the counter (ie.  mucinex DM, robitussin, delsym; dayquil/nyquil has DM as well.)        - Follow up with your PCP or specialty clinic as directed in the next 1-2 weeks if not improved or as needed.  You can call [phone] to schedule an appointment with the appropriate provider.       - Go to the ER if you develop new or worsening symptoms.      - You must understand that you have received an Urgent Care treatment only and that you may be released before all of your medical problems are known or treated.   - You, the patient, will arrange for follow up care as instructed.   - If your condition worsens or fails to improve we recommend that you receive another evaluation at the ER immediately or contact your PCP to discuss your concerns or return here.

## 2022-07-16 NOTE — PATIENT INSTRUCTIONS

## 2022-12-16 ENCOUNTER — OFFICE VISIT (OUTPATIENT)
Dept: URGENT CARE | Facility: CLINIC | Age: 52
End: 2022-12-16
Payer: COMMERCIAL

## 2022-12-16 VITALS
SYSTOLIC BLOOD PRESSURE: 124 MMHG | WEIGHT: 207 LBS | HEART RATE: 76 BPM | BODY MASS INDEX: 38.09 KG/M2 | HEIGHT: 62 IN | DIASTOLIC BLOOD PRESSURE: 82 MMHG | OXYGEN SATURATION: 96 % | TEMPERATURE: 99 F | RESPIRATION RATE: 18 BRPM

## 2022-12-16 DIAGNOSIS — U07.1 COVID-19: Primary | ICD-10-CM

## 2022-12-16 DIAGNOSIS — F17.200 SMOKER: ICD-10-CM

## 2022-12-16 DIAGNOSIS — R05.9 COUGH, UNSPECIFIED TYPE: ICD-10-CM

## 2022-12-16 LAB
CTP QC/QA: YES
CTP QC/QA: YES
POC MOLECULAR INFLUENZA A AGN: NEGATIVE
POC MOLECULAR INFLUENZA B AGN: NEGATIVE
SARS-COV-2 AG RESP QL IA.RAPID: POSITIVE

## 2022-12-16 PROCEDURE — 99214 OFFICE O/P EST MOD 30 MIN: CPT | Mod: S$GLB,,, | Performed by: FAMILY MEDICINE

## 2022-12-16 PROCEDURE — 87502 INFLUENZA DNA AMP PROBE: CPT | Mod: QW,S$GLB,, | Performed by: FAMILY MEDICINE

## 2022-12-16 PROCEDURE — 99214 PR OFFICE/OUTPT VISIT, EST, LEVL IV, 30-39 MIN: ICD-10-PCS | Mod: S$GLB,,, | Performed by: FAMILY MEDICINE

## 2022-12-16 PROCEDURE — 87502 POCT INFLUENZA A/B MOLECULAR: ICD-10-PCS | Mod: QW,S$GLB,, | Performed by: FAMILY MEDICINE

## 2022-12-16 PROCEDURE — 87811 SARS-COV-2 COVID19 W/OPTIC: CPT | Mod: QW,S$GLB,, | Performed by: FAMILY MEDICINE

## 2022-12-16 PROCEDURE — 87811 SARS CORONAVIRUS 2 ANTIGEN POCT, MANUAL READ: ICD-10-PCS | Mod: QW,S$GLB,, | Performed by: FAMILY MEDICINE

## 2022-12-16 RX ORDER — CETIRIZINE HYDROCHLORIDE 10 MG/1
10 TABLET ORAL DAILY
Qty: 30 TABLET | Refills: 0 | Status: SHIPPED | OUTPATIENT
Start: 2022-12-16 | End: 2023-02-15

## 2022-12-16 RX ORDER — BENZONATATE 100 MG/1
100 CAPSULE ORAL 3 TIMES DAILY PRN
Qty: 30 CAPSULE | Refills: 0 | Status: SHIPPED | OUTPATIENT
Start: 2022-12-16 | End: 2022-12-26

## 2022-12-16 RX ORDER — FLUTICASONE PROPIONATE 50 MCG
1 SPRAY, SUSPENSION (ML) NASAL DAILY
Qty: 16 G | Refills: 0 | Status: SHIPPED | OUTPATIENT
Start: 2022-12-16 | End: 2023-05-18 | Stop reason: ALTCHOICE

## 2022-12-16 RX ORDER — GUAIFENESIN 600 MG/1
1200 TABLET, EXTENDED RELEASE ORAL 2 TIMES DAILY
Qty: 40 TABLET | Refills: 0 | Status: SHIPPED | OUTPATIENT
Start: 2022-12-16 | End: 2022-12-26

## 2022-12-16 NOTE — LETTER
[address] [facility], [address] ? [place] [address] ? Phone [phone] ? Fax [phone]           Return to Work/School    Patient: Madeleine Sheikh  YOB: 1970   Date: 12/16/2022      To Whom It May Concern:     Madeleine Sheikh was in contact with/seen in my office on 12/16/2022. COVID-19 is present in our communities across the [facility]. Not all patients are eligible or appropriate to be tested. In this situation, your employee meets the following criteria:     Madeleine Sheikh has met the criteria for COVID-19 testing and has a POSITIVE result. She can return to work once they are asymptomatic for 24 hours without the use of fever reducing medications AND at least five days from the start of symptoms (or from the first positive result if they have no symptoms).      If you have any questions or concerns, or if I can be of further assistance, please do not hesitate to contact me.     Sincerely,    Daphne Hastings NP

## 2022-12-16 NOTE — PROGRESS NOTES
"Subjective:       Patient ID: Madeleine Sheikh is a 52 y.o. female.    Vitals:  height is 5' 2" (1.575 m) and weight is 93.9 kg (207 lb). Her oral temperature is 98.6 °F (37 °C). Her blood pressure is 124/82 and her pulse is 76. Her respiration is 18 and oxygen saturation is 96%.     Chief Complaint: Cough    Pt is coming in with cough and body aches that started yesterday. Pt also has headaches,sinus pressure,congestion and slight fever. Pt says she ran a 100.3 fever. Pt says she isnt sure if she was exposed to anything. Pt took OTC medication to help with mild relief.   Provider note begins below:  Past Medical History:  No date: CKD (chronic kidney disease) stage 3, GFR 30-59 ml/min  No date: Diabetes mellitus  No date: Hypertension  No date: Migraine   Pt with above pmh she presents with c/o cough, body aches, ha, sinus pressure, temp 100.3, ss started yesterday. 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 No chills. No cp or SOB. No GI related symptoms, including, N/v/D or constipation. No anosmia or ageusia.      Cough  This is a new problem. The current episode started yesterday. The problem has been gradually worsening. The problem occurs constantly. The cough is Productive of sputum. Associated symptoms include headaches, nasal congestion and a sore throat. Pertinent negatives include no chest pain, chills, ear congestion, ear pain, fever, heartburn, hemoptysis, myalgias, postnasal drip, rash, rhinorrhea, shortness of breath, sweats, weight loss or wheezing. Associated symptoms comments: Body aches  Chest congestion . Nothing aggravates the symptoms. She has tried OTC cough suppressant (emergency c, nura seltzer, green tea) for the symptoms. The treatment provided mild relief. Her past medical history is significant for bronchitis. There is no history of asthma, bronchiectasis, COPD, emphysema, environmental allergies or pneumonia.     Constitution: Negative for activity change, appetite " change, chills, sweating, fatigue, fever, unexpected weight change and generalized weakness.   HENT:  Positive for congestion and sore throat. Negative for ear pain, nosebleeds, foreign body in nose, postnasal drip, sinus pain, sinus pressure, trouble swallowing and voice change.    Neck: Negative for neck pain and neck stiffness.   Cardiovascular:  Negative for chest pain, leg swelling and palpitations.   Respiratory:  Positive for cough. Negative for chest tightness, sputum production, bloody sputum, COPD, shortness of breath, stridor, wheezing and asthma.    Gastrointestinal:  Negative for abdominal trauma, abdominal pain, abdominal bloating, history of abdominal surgery, nausea, vomiting, constipation, diarrhea and heartburn.   Musculoskeletal:  Negative for muscle ache.   Skin:  Negative for rash.   Allergic/Immunologic: Negative for environmental allergies and asthma.   Neurological:  Positive for headaches.     Objective:      Physical Exam   Constitutional: She is oriented to person, place, and time. She appears well-developed.  Non-toxic appearance. She does not appear ill. No distress. overweight  HENT:   Head: Normocephalic and atraumatic.   Ears:   Right Ear: External ear normal.   Left Ear: External ear normal.   Nose: Nose normal.   Mouth/Throat: Oropharynx is clear and moist.   Eyes: Conjunctivae, EOM and lids are normal. Pupils are equal, round, and reactive to light.   Neck: Trachea normal and phonation normal. Neck supple.   Cardiovascular: S1 normal and S2 normal.   Pulmonary/Chest: Effort normal and breath sounds normal. She has no decreased breath sounds.   Musculoskeletal: Normal range of motion.         General: Normal range of motion.   Neurological: She is alert and oriented to person, place, and time.   Skin: Skin is warm, dry, intact and not diaphoretic.   Psychiatric: Her speech is normal and behavior is normal. Judgment and thought content normal.   Nursing note and vitals reviewed.       Assessment:       1. COVID-19    2. Cough, unspecified type    3. Smoker          Results for orders placed or performed in visit on 12/16/22   POCT Influenza A/B MOLECULAR   Result Value Ref Range    POC Molecular Influenza A Ag Negative Negative, Not Reported    POC Molecular Influenza B Ag Negative Negative, Not Reported     Acceptable Yes    SARS Coronavirus 2 Antigen, POCT Manual Read   Result Value Ref Range    SARS Coronavirus 2 Antigen Positive (A) Negative     Acceptable Yes       Plan:       Lungs ctab  Ss started yesterday, missed work today  Cv +  Lungs ctab, otc meds reviewed  Home medications reviewed, she wishes to take moln, reviewed signs and symptoms, potential side effects, she wishes to proceed with treatment.    Discussed results/diagnosis/plan with patient in clinic. Strict precautions given to patient to monitor for worsening signs and symptoms. Advised to follow up with PCP or specialist.    Explained side effects of medications prescribed with patient and informed him/her to discontinue use if he/she has any side effects and to inform UC or PCP if this occurs. All questions answered. Strict ED verses clinic return precautions stressed and given in depth. Advised if symptoms worsens of fail to improve he/she should go to the Emergency Room. Discharge and follow-up instructions given verbally/printed with the patient who expressed understanding and willingness to comply with my recommendations. Patient voiced understanding and in agreement with current treatment plan. Patient exits the exam room in no acute distress. Conversant and engaged during discharge discussion, verbalized understanding.      COVID-19  -     molnupiravir 200 mg capsule (EUA); Take 4 capsules (800 mg total) by mouth every 12 (twelve) hours. for 5 days  Dispense: 40 capsule; Refill: 0  -     guaiFENesin (MUCINEX) 600 mg 12 hr tablet; Take 2 tablets (1,200 mg total) by mouth 2 (two) times  daily. for 10 days  Dispense: 40 tablet; Refill: 0  -     benzonatate (TESSALON) 100 MG capsule; Take 1 capsule (100 mg total) by mouth 3 (three) times daily as needed for Cough.  Dispense: 30 capsule; Refill: 0  -     fluticasone propionate (FLONASE) 50 mcg/actuation nasal spray; 1 spray (50 mcg total) by Each Nostril route once daily.  Dispense: 16 g; Refill: 0  -     cetirizine (ZYRTEC) 10 MG tablet; Take 1 tablet (10 mg total) by mouth once daily.  Dispense: 30 tablet; Refill: 0  -     COVID-19 Home Symptom Monitoring  - Duration (days): 14    Cough, unspecified type  -     POCT Influenza A/B MOLECULAR  -     SARS Coronavirus 2 Antigen, POCT Manual Read    Smoker  -     Ambulatory referral/consult to Smoking Cessation Program           Medical Decision Making:   Clinical Tests:   Lab Tests: Ordered and Reviewed  Urgent Care Management:  Patient evaluated in the  and has remained stable on room air.  Vital signs do not indicate sepsis/severe infection, hypoxia, tachypnea, or respiratory distress, and in my professional opinion the benefits of hospital admission/observation DO NOT outweigh the risks of hospital-associated exposures, and the patient is well enough for discharge home with supportive care. The patient was counseled appropriately and provided with instructions for social isolation/home quarantine, hand washing, and symptomatic treatment. The patient was also given a work excuse, if needed. return precautions, including worsening symptoms or severe dyspnea, were discussed with the patient, who expressed understanding and is comfortable with plan at this time.   Additional MDM:     Heart Failure Score:   COPD = No    Patient Instructions   General Discharge Instructions   PLEASE READ YOUR DISCHARGE INSTRUCTIONS ENTIRELY AS IT CONTAINS IMPORTANT INFORMATION.  If you were prescribed a narcotic or controlled medication, do not drive or operate heavy equipment or machinery while taking these  medications.  If you were prescribed antibiotics, please take them to completion.  You must understand that you've received an Urgent Care treatment only and that you may be released before all your medical problems are known or treated. You, the patient, will arrange for follow up care as instructed.    OVER THE COUNTER RECOMMENDATIONS/SUGGESTIONS.    Make sure to stay well hydrated.    Use Nasal Saline to mechanically move any post nasal drip from your eustachian tube or from the back of your throat.    Use warm salt water gargles to ease your throat pain. Warm salt water gargles as needed for sore throat- 1/2 tsp salt to 1 cup warm water, gargle as desired.    Use an antihistamine such as Claritin, Zyrtec or Allegra to dry you out.    Use pseudoephedrine (behind the counter) to decongest. Pseudoephedrine 30 mg up to 240 mg /day. It can raise your blood pressure and give you palpitations.    Use mucinex (guaifenesin) to break up mucous up to 2400mg/day to loosen any mucous.    The mucinex DM pill has a cough suppressant that can be sedating. It can be used at night to stop the tickle at the back of your throat.    You can use Mucinex D (it has guaifenesin and a high dose of pseudoephedrine) in the mornings to help decongest.    Use Afrin in each nare for no longer than 3 days, as it is addictive. It can also dry out your mucous membranes and cause elevated blood pressure. This is especially useful if you are flying.    Use Flonase 1-2 sprays/nostril per day. It is a local acting steroid nasal spray, if you develop a bloody nose, stop using the medication immediately.    Sometimes Nyquil at night is beneficial to help you get some rest, however it is sedating and it does have an antihistamine, and tylenol.    Honey is a natural cough suppressant that can be used.    Tylenol up to 4,000 mg a day is safe for short periods and can be used for body aches, pain, and fever. However in high doses and prolonged use it can  cause liver irritation.    Ibuprofen is a non-steroidal anti-inflammatory that can be used for body aches, pain, and fever.However it can also cause stomach irritation if over used.     Follow up with your PCP or specialty clinic as instructed in the next 2-3 days if not improved or as needed. You can call [phone] to schedule an appointment with appropriate provider.      If you condition worsens, we recommend that you receive another evaluation at the emergency room immediately or contact your primary medical clinic's after hours call service to discuss your concerns.      Please return here or go to the Emergency Department for any concerns or worsening condition.   You can also call [phone] to schedule an appointment with the appropriate provider.    Please return here or go to the Emergency Department for any concerns or worsening of condition.    Thank you for choosing [facility]!    Our goal in the Urgent Care is to always provide outstanding medical care. You may receive a survey by mail or e-mail in the next week regarding your experience today. We would greatly appreciate you completing and returning the survey. Your feedback provides us with a way to recognize our staff who provide very good care, and it helps us learn how to improve when your experience was below our aspiration of excellence.      We appreciate you trusting us with your medical care. We hope you feel better soon. We will be happy to take care of you for all of your future medical needs.    Sincerely,    REBECCA Sarmiento  POSITIVE COVID TEST    You have tested positive for COVID-19 today.           ISOLATION    If you tested positive and do not have symptoms, you must isolate for 5 days starting on the day of the positive test. I         If you tested positive and have symptoms, you must isolate for 5 days starting on the day of the first symptoms,  not the day of the positive test.         This is the most  important part, both the CDC and the [facility] emphasize that you do not test out of isolation.         After 5 days, if your symptoms have improved and you have not had fever on day 5, you can return to the community on day 6- NO TESTING REQUIRED!          In fact, we do not retest if you were positive in the last 90 days.         After your 5 days of isolation are completed, the CDC recommends strict mask use for the first 5 days that you come out of isolation.      When to Seek Emergency Medical Attention  Look for emergency warning signs* for COVID-19. If someone is showing any of these signs, seek emergency medical care immediately:    Trouble breathing  Persistent pain or pressure in the chest  New confusion  Inability to wake or stay awake  Pale, gray, or blue-colored skin, lips, or nail beds, depending on skin tone  *This list is not all possible symptoms. Please call your medical provider for any other symptoms that are severe or concerning to you.    Call 911 or call ahead to your local emergency facility: Notify the  that you are seeking care for someone who has or may have COVID-19.       For up to date guidelines please visit www.cdc.gov  If you have any questions you may call [facility] line [phone]    What advice should be given to patients with known or presumed COVID-19 managed at home?    For most patients with COVID-19 who are managed at home, we advise the following:    ?Supportive care with antipyretics/analgesics (eg, acetaminophen) and hydration    ?Close contact with their health care provider    ?Monitoring for clinical worsening, particularly the development of new or worsening dyspnea, which should prompt clinical evaluation and possible hospitalization    ?Separation from other household members, including pets (eg, staying in a separate room when possible and wearing a mask when in the same room)    ?Frequent hand washing for all family members    ?Frequent  disinfection of commonly touched surfaces    Some patients with cough or dyspnea may experience symptomatic improvement with self-proning (resting in the prone rather than the supine position)    All other care is generally supportive, similar to that advised for other acute viral illnesses:    ?We advise that patients stay well hydrated, particularly those patients with sustained or higher fevers, in whom insensible fluid losses may be significant.    ?Cough that is persistent, interferes with sleep, or causes discomfort can be managed with an over-the-counter cough medication (eg, dextromethorphan) or prescription benzonatate, 100 to 200 mg orally three times daily as needed.    ?We advise rest as needed during the acute illness; for patients without hypoxia, frequent repositioning and ambulation is encouraged. In addition, we encourage all patients to advance activity as soon as tolerated during recovery.      Additional instructions:  Separate yourself from other people and animals in your home.  Call ahead before visiting your doctor.  Wear a facemask when around others.  Cover your coughs and sneezes.  Wash your hands often with soap and water; hand  can be used, too.  Avoid sharing personal household items.  Wipe down surfaces used daily.  Monitor your symptoms. Seek prompt medical attention if your illness is worsening (e.g., difficulty breathing).   Before seeking care, call your healthcare provider.  If you have a medical emergency and need to call 911, notify the dispatch personnel that you have, or are being evaluated for COVID-19. If possible, put on a facemask before emergency medical services arrive.      Contact Tracing for patients who have been vaccinated and agree to sequencing.    As one of the next steps, you will receive a call or text from the [place] Department of Health ([facility]) COVID-19 Tracing Team. See the contact information below so you know not to ignore the health departments  call. It is important that you contact them back immediately so they can help.      Contact Tracer Number:  [phone]  Caller ID for most carriers: [facility]     What is contact tracing?  Contact tracing is a process that helps identify everyone who has been in close contact with an infected person. Contact tracers let those people know they may have been exposed and guide them on next steps. Confidentiality is important for everyone; no one will be told who may have exposed them to the virus.  Your involvement is important. The more we know about where and how this virus is spreading, the better chance we have at stopping it from spreading further.  What does exposure mean?  Exposure means you have been within 6 feet for more than 15 minutes with a person who has or had COVID-19.  What kind of questions do the contact tracers ask?  A contact tracer will confirm your basic contact information including name, address, phone number, and next of kin, as well as asking about any symptoms you may have had. Theyll also ask you how you think you may have gotten sick, such as places where you may have been exposed to the virus, and people you were with. Those names will never be shared with anyone outside of that call, and will only be used to help trace and stop the spread of the virus.   I have privacy concerns. How will the state use my information?  Your privacy about your health is important. All calls are completed using call centers that use the appropriate health privacy protection measures (HIPAA compliance), meaning that your patient information is safe. No one will ever ask you any questions related to immigration status. Your health comes first.   Do I have to participate?  You do not have to participate, but we strongly encourage you to. Contact tracing can help us catch and control new outbreaks as theyre developing to keep your friends and family safe.   What if I dont hear from anyone?  If you  dont receive a call within 24 hours, you can call the number above right away to inquire about your status. That line is open from 8:00 am - 8:00 p.m., 7 days a week.  Contact tracing saves lives! Together, we have the power to beat this virus and keep our loved ones and neighbors safe.    For more information see CDC link below.      https://www.cdc.gov/coronavirus/2019-ncov/hcp/guidance-prevent-spread.html#precautions        Sources:  CDC, [place] [facility]

## 2022-12-16 NOTE — PATIENT INSTRUCTIONS
General Discharge Instructions   PLEASE READ YOUR DISCHARGE INSTRUCTIONS ENTIRELY AS IT CONTAINS IMPORTANT INFORMATION.  If you were prescribed a narcotic or controlled medication, do not drive or operate heavy equipment or machinery while taking these medications.  If you were prescribed antibiotics, please take them to completion.  You must understand that you've received an Urgent Care treatment only and that you may be released before all your medical problems are known or treated. You, the patient, will arrange for follow up care as instructed.    OVER THE COUNTER RECOMMENDATIONS/SUGGESTIONS.    Make sure to stay well hydrated.    Use Nasal Saline to mechanically move any post nasal drip from your eustachian tube or from the back of your throat.    Use warm salt water gargles to ease your throat pain. Warm salt water gargles as needed for sore throat- 1/2 tsp salt to 1 cup warm water, gargle as desired.    Use an antihistamine such as Claritin, Zyrtec or Allegra to dry you out.    Use pseudoephedrine (behind the counter) to decongest. Pseudoephedrine 30 mg up to 240 mg /day. It can raise your blood pressure and give you palpitations.    Use mucinex (guaifenesin) to break up mucous up to 2400mg/day to loosen any mucous.    The mucinex DM pill has a cough suppressant that can be sedating. It can be used at night to stop the tickle at the back of your throat.    You can use Mucinex D (it has guaifenesin and a high dose of pseudoephedrine) in the mornings to help decongest.    Use Afrin in each nare for no longer than 3 days, as it is addictive. It can also dry out your mucous membranes and cause elevated blood pressure. This is especially useful if you are flying.    Use Flonase 1-2 sprays/nostril per day. It is a local acting steroid nasal spray, if you develop a bloody nose, stop using the medication immediately.    Sometimes Nyquil at night is beneficial to help you get some rest, however it is sedating and it  does have an antihistamine, and tylenol.    Honey is a natural cough suppressant that can be used.    Tylenol up to 4,000 mg a day is safe for short periods and can be used for body aches, pain, and fever. However in high doses and prolonged use it can cause liver irritation.    Ibuprofen is a non-steroidal anti-inflammatory that can be used for body aches, pain, and fever.However it can also cause stomach irritation if over used.     Follow up with your PCP or specialty clinic as instructed in the next 2-3 days if not improved or as needed. You can call [phone] to schedule an appointment with appropriate provider.      If you condition worsens, we recommend that you receive another evaluation at the emergency room immediately or contact your primary medical clinic's after hours call service to discuss your concerns.      Please return here or go to the Emergency Department for any concerns or worsening condition.   You can also call [phone] to schedule an appointment with the appropriate provider.    Please return here or go to the Emergency Department for any concerns or worsening of condition.    Thank you for choosing [facility]!    Our goal in the Urgent Care is to always provide outstanding medical care. You may receive a survey by mail or e-mail in the next week regarding your experience today. We would greatly appreciate you completing and returning the survey. Your feedback provides us with a way to recognize our staff who provide very good care, and it helps us learn how to improve when your experience was below our aspiration of excellence.      We appreciate you trusting us with your medical care. We hope you feel better soon. We will be happy to take care of you for all of your future medical needs.    Sincerely,    REBECCA Sarmiento  POSITIVE COVID TEST    You have tested positive for COVID-19 today.           ISOLATION    If you tested positive and do not have symptoms, you  must isolate for 5 days starting on the day of the positive test. I         If you tested positive and have symptoms, you must isolate for 5 days starting on the day of the first symptoms,  not the day of the positive test.         This is the most important part, both the CDC and the [facility] emphasize that you do not test out of isolation.         After 5 days, if your symptoms have improved and you have not had fever on day 5, you can return to the community on day 6- NO TESTING REQUIRED!          In fact, we do not retest if you were positive in the last 90 days.         After your 5 days of isolation are completed, the CDC recommends strict mask use for the first 5 days that you come out of isolation.      When to Seek Emergency Medical Attention  Look for emergency warning signs* for COVID-19. If someone is showing any of these signs, seek emergency medical care immediately:    Trouble breathing  Persistent pain or pressure in the chest  New confusion  Inability to wake or stay awake  Pale, gray, or blue-colored skin, lips, or nail beds, depending on skin tone  *This list is not all possible symptoms. Please call your medical provider for any other symptoms that are severe or concerning to you.    Call 911 or call ahead to your local emergency facility: Notify the  that you are seeking care for someone who has or may have COVID-19.       For up to date guidelines please visit www.cdc.gov  If you have any questions you may call [facility] line [phone]    What advice should be given to patients with known or presumed COVID-19 managed at home?    For most patients with COVID-19 who are managed at home, we advise the following:    ?Supportive care with antipyretics/analgesics (eg, acetaminophen) and hydration    ?Close contact with their health care provider    ?Monitoring for clinical worsening, particularly the development of new or worsening dyspnea, which should prompt clinical  evaluation and possible hospitalization    ?Separation from other household members, including pets (eg, staying in a separate room when possible and wearing a mask when in the same room)    ?Frequent hand washing for all family members    ?Frequent disinfection of commonly touched surfaces    Some patients with cough or dyspnea may experience symptomatic improvement with self-proning (resting in the prone rather than the supine position)    All other care is generally supportive, similar to that advised for other acute viral illnesses:    ?We advise that patients stay well hydrated, particularly those patients with sustained or higher fevers, in whom insensible fluid losses may be significant.    ?Cough that is persistent, interferes with sleep, or causes discomfort can be managed with an over-the-counter cough medication (eg, dextromethorphan) or prescription benzonatate, 100 to 200 mg orally three times daily as needed.    ?We advise rest as needed during the acute illness; for patients without hypoxia, frequent repositioning and ambulation is encouraged. In addition, we encourage all patients to advance activity as soon as tolerated during recovery.      Additional instructions:  Separate yourself from other people and animals in your home.  Call ahead before visiting your doctor.  Wear a facemask when around others.  Cover your coughs and sneezes.  Wash your hands often with soap and water; hand  can be used, too.  Avoid sharing personal household items.  Wipe down surfaces used daily.  Monitor your symptoms. Seek prompt medical attention if your illness is worsening (e.g., difficulty breathing).   Before seeking care, call your healthcare provider.  If you have a medical emergency and need to call 911, notify the dispatch personnel that you have, or are being evaluated for COVID-19. If possible, put on a facemask before emergency medical services arrive.      Contact Tracing for patients who have been  vaccinated and agree to sequencing.    As one of the next steps, you will receive a call or text from the [place] Department [facility] ([facility]) COVID-19 Tracing Team. See the contact information below so you know not to ignore the health departments call. It is important that you contact them back immediately so they can help.      Contact Tracer Number:  [phone]  Caller ID for most carriers: [place] Dept [facility]     What is contact tracing?  Contact tracing is a process that helps identify everyone who has been in close contact with an infected person. Contact tracers let those people know they may have been exposed and guide them on next steps. Confidentiality is important for everyone; no one will be told who may have exposed them to the virus.  Your involvement is important. The more we know about where and how this virus is spreading, the better chance we have at stopping it from spreading further.  What does exposure mean?  Exposure means you have been within 6 feet for more than 15 minutes with a person who has or had COVID-19.  What kind of questions do the contact tracers ask?  A contact tracer will confirm your basic contact information including name, address, phone number, and next of kin, as well as asking about any symptoms you may have had. Theyll also ask you how you think you may have gotten sick, such as places where you may have been exposed to the virus, and people you were with. Those names will never be shared with anyone outside of that call, and will only be used to help trace and stop the spread of the virus.   I have privacy concerns. How will the state use my information?  Your privacy about your health is important. All calls are completed using call centers that use the appropriate health privacy protection measures (HIPAA compliance), meaning that your patient information is safe. No one will ever ask you any questions related to immigration status. Your health comes first.   Do I  have to participate?  You do not have to participate, but we strongly encourage you to. Contact tracing can help us catch and control new outbreaks as theyre developing to keep your friends and family safe.   What if I dont hear from anyone?  If you dont receive a call within 24 hours, you can call the number above right away to inquire about your status. That line is open from 8:00 am - 8:00 p.m., 7 days a week.  Contact tracing saves lives! Together, we have the power to beat this virus and keep our loved ones and neighbors safe.    For more information see CDC link below.      https://www.cdc.gov/coronavirus/2019-ncov/hcp/guidance-prevent-spread.html#precautions        Sources:  CDC, [place] [facility] and [facility]

## 2023-01-03 ENCOUNTER — CLINICAL SUPPORT (OUTPATIENT)
Dept: SMOKING CESSATION | Facility: CLINIC | Age: 53
End: 2023-01-03

## 2023-01-03 DIAGNOSIS — F17.200 NICOTINE DEPENDENCE: Primary | ICD-10-CM

## 2023-01-03 PROCEDURE — 99999 PR PBB SHADOW E&M-EST. PATIENT-LVL II: ICD-10-PCS | Mod: PBBFAC,,,

## 2023-01-03 PROCEDURE — 99404 PR PREVENT COUNSEL,INDIV,60 MIN: ICD-10-PCS | Mod: S$GLB,,,

## 2023-01-03 PROCEDURE — 99999 PR PBB SHADOW E&M-EST. PATIENT-LVL II: CPT | Mod: PBBFAC,,,

## 2023-01-03 PROCEDURE — 99404 PREV MED CNSL INDIV APPRX 60: CPT | Mod: S$GLB,,,

## 2023-01-03 RX ORDER — VARENICLINE TARTRATE 0.5 (11)-1
1 KIT ORAL 2 TIMES DAILY
Qty: 53 TABLET | Refills: 0 | Status: SHIPPED | OUTPATIENT
Start: 2023-01-03 | End: 2023-02-07 | Stop reason: SDUPTHER

## 2023-01-03 NOTE — Clinical Note
PATIENT PRESENTS FOR INTAKE SMOKING 10 CIGARETTES PER DAY, AFTER ASSESSMENT AND DISCUSSION RECOMMEND THE CHANTIX STARTER PK, SHE HAD CHANTIX MARKED AS AN ALLERGY STATING THAT SHE WAS NAUSEOUS TAKING IT ONCE AND VOMITED THEREFORE IT WAS MARKED AS AN ALLERGY, EXPLAINED TO HER THAT THIS WAS A S/E FROM THE CHANTIX AND LIKELY NOT A TRUE ALLERGY. ENCOURAGED HER TO TRY THE CHANTIX AND MAKE SURE SHE EATS SOMETHING BEFORE TAKING. INTAKE HANDOUT DISCUSSED, ENCOURAGEMENT PROVIDED, GOALS SET WILL FOLLOW

## 2023-01-17 ENCOUNTER — CLINICAL SUPPORT (OUTPATIENT)
Dept: SMOKING CESSATION | Facility: CLINIC | Age: 53
End: 2023-01-17

## 2023-01-17 DIAGNOSIS — F17.200 NICOTINE DEPENDENCE: Primary | ICD-10-CM

## 2023-01-17 PROCEDURE — 99999 PR PBB SHADOW E&M-EST. PATIENT-LVL II: ICD-10-PCS | Mod: PBBFAC,,,

## 2023-01-17 PROCEDURE — 99999 PR PBB SHADOW E&M-EST. PATIENT-LVL II: CPT | Mod: PBBFAC,,,

## 2023-01-17 PROCEDURE — 99402 PREV MED CNSL INDIV APPRX 30: CPT | Mod: S$GLB,,,

## 2023-01-17 PROCEDURE — 99402 PR PREVENT COUNSEL,INDIV,30 MIN: ICD-10-PCS | Mod: S$GLB,,,

## 2023-01-17 NOTE — PROGRESS NOTES
Individual Follow-Up Form    1/17/2023    Quit Date: n/a    Clinical Status of Patient: Outpatient    Length of Service: 30 minutes    Continuing Medication: yes  Chantix    Other Medications: N/A     Target Symptoms: Withdrawal and medication side effects. The following were  rated moderate (3) to severe (4) on TCRS:  Moderate (3): 0  Severe (4): 0    Comments: PATIENT PRESENTS FOR FOLLOW UP TELEPHONICALLY  smoking 6-7 per day, not really enjoying them any longer, she is down from 15 per day, she has had some desired effects from the chantix, tts feels that the chantix is starting to work and tht she will not be smoking in two weeks, recommend continuing the same dose of the chantix and will reorder for refill at next appt     Diagnosis: F17.210    Next Visit: 2 weeks

## 2023-02-07 ENCOUNTER — CLINICAL SUPPORT (OUTPATIENT)
Dept: SMOKING CESSATION | Facility: CLINIC | Age: 53
End: 2023-02-07

## 2023-02-07 ENCOUNTER — OFFICE VISIT (OUTPATIENT)
Dept: PRIMARY CARE CLINIC | Facility: CLINIC | Age: 53
End: 2023-02-07
Payer: COMMERCIAL

## 2023-02-07 VITALS
BODY MASS INDEX: 41.46 KG/M2 | TEMPERATURE: 98 F | HEART RATE: 62 BPM | SYSTOLIC BLOOD PRESSURE: 110 MMHG | DIASTOLIC BLOOD PRESSURE: 68 MMHG | OXYGEN SATURATION: 97 % | WEIGHT: 225.31 LBS | HEIGHT: 62 IN

## 2023-02-07 DIAGNOSIS — F17.200 NICOTINE DEPENDENCE: Primary | ICD-10-CM

## 2023-02-07 DIAGNOSIS — E78.5 HYPERLIPIDEMIA, UNSPECIFIED HYPERLIPIDEMIA TYPE: ICD-10-CM

## 2023-02-07 DIAGNOSIS — I10 HYPERTENSION, UNSPECIFIED TYPE: ICD-10-CM

## 2023-02-07 DIAGNOSIS — F41.9 ANXIETY AND DEPRESSION: ICD-10-CM

## 2023-02-07 DIAGNOSIS — F32.A ANXIETY AND DEPRESSION: ICD-10-CM

## 2023-02-07 DIAGNOSIS — R11.2 NAUSEA AND VOMITING, UNSPECIFIED VOMITING TYPE: ICD-10-CM

## 2023-02-07 DIAGNOSIS — F43.0 ACUTE STRESS DISORDER: ICD-10-CM

## 2023-02-07 DIAGNOSIS — E03.9 HYPOTHYROIDISM, UNSPECIFIED TYPE: ICD-10-CM

## 2023-02-07 DIAGNOSIS — E11.65 TYPE 2 DIABETES MELLITUS WITH HYPERGLYCEMIA, WITHOUT LONG-TERM CURRENT USE OF INSULIN: Primary | ICD-10-CM

## 2023-02-07 DIAGNOSIS — M79.7 FIBROMYALGIA: ICD-10-CM

## 2023-02-07 DIAGNOSIS — G47.9 SLEEP DISORDER: ICD-10-CM

## 2023-02-07 PROCEDURE — 99215 PR OFFICE/OUTPT VISIT, EST, LEVL V, 40-54 MIN: ICD-10-PCS | Mod: S$GLB,,, | Performed by: FAMILY MEDICINE

## 2023-02-07 PROCEDURE — 99402 PREV MED CNSL INDIV APPRX 30: CPT | Mod: S$GLB,,,

## 2023-02-07 PROCEDURE — 99215 OFFICE O/P EST HI 40 MIN: CPT | Mod: S$GLB,,, | Performed by: FAMILY MEDICINE

## 2023-02-07 PROCEDURE — 99999 PR PBB SHADOW E&M-EST. PATIENT-LVL IV: ICD-10-PCS | Mod: PBBFAC,,, | Performed by: FAMILY MEDICINE

## 2023-02-07 PROCEDURE — 99999 PR PBB SHADOW E&M-EST. PATIENT-LVL II: ICD-10-PCS | Mod: PBBFAC,,,

## 2023-02-07 PROCEDURE — 99402 PR PREVENT COUNSEL,INDIV,30 MIN: ICD-10-PCS | Mod: S$GLB,,,

## 2023-02-07 PROCEDURE — 99999 PR PBB SHADOW E&M-EST. PATIENT-LVL IV: CPT | Mod: PBBFAC,,, | Performed by: FAMILY MEDICINE

## 2023-02-07 PROCEDURE — 99999 PR PBB SHADOW E&M-EST. PATIENT-LVL II: CPT | Mod: PBBFAC,,,

## 2023-02-07 RX ORDER — HYDROCHLOROTHIAZIDE 12.5 MG/1
12.5 CAPSULE ORAL DAILY
Qty: 30 CAPSULE | Refills: 5 | Status: SHIPPED | OUTPATIENT
Start: 2023-02-07 | End: 2023-12-08 | Stop reason: SDUPTHER

## 2023-02-07 RX ORDER — VENLAFAXINE HYDROCHLORIDE 75 MG/1
75 CAPSULE, EXTENDED RELEASE ORAL DAILY
Qty: 30 CAPSULE | Refills: 11 | Status: SHIPPED | OUTPATIENT
Start: 2023-02-07 | End: 2023-12-08 | Stop reason: SDUPTHER

## 2023-02-07 RX ORDER — ESZOPICLONE 2 MG/1
2 TABLET, FILM COATED ORAL NIGHTLY
Qty: 30 TABLET | Refills: 5 | Status: SHIPPED | OUTPATIENT
Start: 2023-02-07 | End: 2023-03-17 | Stop reason: SDUPTHER

## 2023-02-07 RX ORDER — SEMAGLUTIDE 1.34 MG/ML
1 INJECTION, SOLUTION SUBCUTANEOUS WEEKLY
Qty: 1 PEN | Refills: 5 | Status: SHIPPED | OUTPATIENT
Start: 2023-02-07 | End: 2023-05-18 | Stop reason: DRUGHIGH

## 2023-02-07 RX ORDER — ALPRAZOLAM 0.5 MG/1
0.5 TABLET ORAL DAILY
Qty: 30 TABLET | Refills: 5 | Status: SHIPPED | OUTPATIENT
Start: 2023-02-07 | End: 2023-11-22 | Stop reason: SDUPTHER

## 2023-02-07 RX ORDER — ONDANSETRON 4 MG/1
4 TABLET, FILM COATED ORAL EVERY 8 HOURS PRN
Qty: 20 TABLET | Refills: 1 | Status: SHIPPED | OUTPATIENT
Start: 2023-02-07

## 2023-02-07 RX ORDER — CYANOCOBALAMIN 1000 UG/ML
1000 INJECTION, SOLUTION INTRAMUSCULAR; SUBCUTANEOUS
Qty: 1 ML | Refills: 5 | Status: SHIPPED | OUTPATIENT
Start: 2023-02-07 | End: 2023-12-08

## 2023-02-07 RX ORDER — LIOTHYRONINE SODIUM 5 UG/1
5 TABLET ORAL 3 TIMES DAILY
Qty: 90 TABLET | Refills: 5 | Status: SHIPPED | OUTPATIENT
Start: 2023-02-07 | End: 2023-12-08 | Stop reason: SDUPTHER

## 2023-02-07 RX ORDER — CELECOXIB 100 MG/1
100 CAPSULE ORAL DAILY
Qty: 30 CAPSULE | Refills: 5 | Status: SHIPPED | OUTPATIENT
Start: 2023-02-07 | End: 2023-12-08

## 2023-02-07 RX ORDER — VARENICLINE TARTRATE 1 MG/1
TABLET, FILM COATED ORAL 2 TIMES DAILY
Qty: 56 TABLET | Refills: 0 | Status: SHIPPED | OUTPATIENT
Start: 2023-02-07 | End: 2023-04-08

## 2023-02-07 RX ORDER — TIZANIDINE 4 MG/1
4 TABLET ORAL EVERY 6 HOURS PRN
Qty: 60 TABLET | Refills: 0 | Status: SHIPPED | OUTPATIENT
Start: 2023-02-07 | End: 2023-03-09

## 2023-02-07 RX ORDER — LEVOTHYROXINE SODIUM 75 UG/1
75 TABLET ORAL
Qty: 30 TABLET | Refills: 11 | Status: SHIPPED | OUTPATIENT
Start: 2023-02-07 | End: 2023-12-08 | Stop reason: SDUPTHER

## 2023-02-07 NOTE — PROGRESS NOTES
Individual Follow-Up Form    2/7/2023    Quit Date: n/a    Clinical Status of Patient: Outpatient    Length of Service: 30 minutes    Continuing Medication: yes  Chantix    Other Medications: N/A     Target Symptoms: Withdrawal and medication side effects. The following were  rated moderate (3) to severe (4) on TCRS:  Moderate (3): 0  Severe (4): 0    Comments: PATIENT PRESENTS FOR FOLLOW UP DOING OK SOME DAYS BETTER THEN OTHER, SHE IS CURRENTLY ON MONTH ONE OF CHANTIX AND NEEDS A REFILL, RECOMMEND SHE CONTINUE THE CHANTIX AND CONTINUE TO WORK ON CUTTING BACK, STRATEGIES AND SESSION HANDOUT DISCUSSED, WILL FOLLOW     Diagnosis: F17.210    Next Visit: 2 weeks

## 2023-02-07 NOTE — PROGRESS NOTES
"        [facility]       [address] [place] [address]      [phone]     Allegra Cruz MD         .      Office Visit  02/07/2023      Subjective      HPI:  Madeleine Sheikh is a 52 y.o. female presents today in clinic for "No chief complaint on file.  ."     Patient [place] [facility] [place] is having more stress- now living in [place] caring for her elderly mother. She has just recovered from covid last month.  Recovered well. Patient is having more FMG pain due to stress. Patient is using warm soaks, massage, biofreeze. Patient did stop ozempic when she ran out of refills. Patient has gained 20#.  She is going through a lot of stress right now taking care of her elderly mom, and starting to self neglect her own care.  Her fibromyalgia is getting worse, she is not sleeping well and she is gaining a lot of weight.  She is stress from work in the demands from work and having no time to care for self.  She is starting to go back to therapy, but thinks she needs changes in her medications in the meantime.        REVIEW OF SYMPTOMS  General: weight gain ( 20 lbs)  Psychological ROS: difficulty falling/staying asleep, feels life is overwhelming, anxiety, depression, and trouble concentrating   Endocrine ROS: fatigue and difficulty losing weight  Ophthalmic ROS: negative   ENT ROS: negative .  Cardiovascular ROS: negative   Breast ROS: negative   Respiratory ROS: negative   Gastrointestinal ROS: increased appetite and cravings  Genito-Urinary ROS: negative   Musculoskeletal ROS: joint pains, muscle pain, and muscle weakness  Dermatological ROS: negative  Neurological ROS: headaches/migraines  Hematological and Lymphatic ROS: denies             The following were updated and reviewed by myself in the chart: medications, past medical history, past surgical history, family history, social history, and allergies.     MEDICATIONS    Current " Outpatient Medications:     azelastine (OPTIVAR) 0.05 % ophthalmic solution, Apply 1 drop to eye 2 (two) times daily as needed., Disp: , Rfl:     erenumab-aooe (AIMOVIG AUTOINJECTOR SUBQ), every 30 days. , Disp: , Rfl:     fluticasone propionate (FLONASE) 50 mcg/actuation nasal spray, 1 spray (50 mcg total) by Each Nostril route once daily., Disp: 16 g, Rfl: 0    galcanezumab-gnlm (EMGALITY PEN) 120 mg/mL PnIj, Inject 120 mg into the skin., Disp: , Rfl:     pantoprazole (PROTONIX) 40 MG tablet, Take 1 tablet every day by oral route for 90 days., Disp: , Rfl:     TROKENDI XR 50 mg Cp24, Take 1 tablet by mouth once daily., Disp: , Rfl:     UBRELVY 100 mg tablet, Take by mouth., Disp: , Rfl:     varenicline (CHANTIX GERMAN) 0.5 mg (11)- 1 mg (42) tablet, Take one 0.5mg tab by mouth once daily X3 days,then increase to one 0.5mg tab twice daily X4 days,then increase to one 1mg tab twice daily,  PLEASE MAIL, Disp: 53 tablet, Rfl: 0    ALPRAZolam (XANAX) 0.5 MG tablet, Take 1 tablet (0.5 mg total) by mouth once daily., Disp: 30 tablet, Rfl: 5    celecoxib (CELEBREX) 100 MG capsule, Take 1 capsule (100 mg total) by mouth once daily., Disp: 30 capsule, Rfl: 5    cetirizine (ZYRTEC) 10 MG tablet, Take 1 tablet (10 mg total) by mouth once daily., Disp: 30 tablet, Rfl: 0    cyanocobalamin 1,000 mcg/mL injection, Inject 1 mL (1,000 mcg total) into the skin every 30 days., Disp: 1 mL, Rfl: 5    eszopiclone (LUNESTA) 2 MG Tab, Take 1 tablet (2 mg total) by mouth nightly., Disp: 30 tablet, Rfl: 5    hydroCHLOROthiazide (MICROZIDE) 12.5 mg capsule, Take 1 capsule (12.5 mg total) by mouth once daily., Disp: 30 capsule, Rfl: 5    levothyroxine (SYNTHROID) 75 MCG tablet, Take 1 tablet (75 mcg total) by mouth before breakfast., Disp: 30 tablet, Rfl: 11    liothyronine (CYTOMEL) 5 MCG Tab, Take 1 tablet (5 mcg total) by mouth 3 (three) times daily., Disp: 90 tablet, Rfl: 5    ondansetron (ZOFRAN) 4 MG tablet, Take 1 tablet (4 mg total) by  "mouth every 8 (eight) hours as needed for Nausea., Disp: 20 tablet, Rfl: 1    OZEMPIC 1 mg/dose (2 mg/1.5 mL) PnIj, Inject 1 mg into the skin once a week., Disp: 1 pen, Rfl: 5    tiZANidine (ZANAFLEX) 4 MG tablet, Take 1 tablet (4 mg total) by mouth every 6 (six) hours as needed (pain)., Disp: 60 tablet, Rfl: 0    venlafaxine (EFFEXOR-XR) 75 MG 24 hr capsule, Take 1 capsule (75 mg total) by mouth once daily., Disp: 30 capsule, Rfl: 11    ALLERGIES  Review of patient's allergies indicates:   Allergen Reactions    Amlodipine Swelling    Augmentin [amoxicillin-pot clavulanate] Anaphylaxis and Other (See Comments)     Liver issues       Azithromycin Swelling and Nausea And Vomiting    Doxycycline Shortness Of Breath and Nausea And Vomiting    Morphine Hallucinations and Shortness Of Breath     hallucinations      Sumatriptan Swelling    Tioconazole Hives    Bupropion hcl     Canagliflozin Other (See Comments)     Other reaction(s): Other (See Comments)    Doxycycline monohydrate     Morphine sulfate           /68   Pulse 62   Temp 97.5 °F (36.4 °C)   Ht 5' 2" (1.575 m)   Wt 102.2 kg (225 lb 5 oz)   LMP 09/10/2019   SpO2 97%   BMI 41.21 kg/m²   Wt Readings from Last 3 Encounters:   02/07/23 102.2 kg (225 lb 5 oz)   01/23/23 101.2 kg (223 lb)   12/16/22 93.9 kg (207 lb)     BP Readings from Last 3 Encounters:   02/07/23 110/68   01/23/23 112/76   12/16/22 124/82          PHYSICAL EXAM:     Physical Exam:    Constitutional:   General Appearance:  healthy and alert and appears fatigued  Body Habitus: obese  Level of Distress: NAD   Ambulation: antalgic gait  Psychiatric:   Appearance: well dressed, appropriate  Speech normal tone, pitch and volume  Cognition: memory normal   Orientation: orientated to time, place and person   Mood: depressed, concerned, worried, and anxious  Movement: normal and sitting calmly  Head: normocephalic and atraumatic   Eyes:    Eye Inspection: grossly normal , EOMI  Pupils: " PERRLA  Conjunctivae: clear  Lids: normal eyelids  ENMT:     Neck:   Neck: normal, supple with no adenopathy or mass, carotids normal upstroke, no bruits, increased neck circumference, and submental fat  Thyroid: normal thyroid appearance and examination  Lungs: Respiratory exam normal and unlabored;   Cardiovascular: Cardiovascular exam normal with RRR  Abdomen: soft and non-distended with no tenderness and increased waist circumference   Musculoskeletal: abnormal motor strength  Extremities: Extremities are normal on exam  Neurologic: Neurological exam is normal and grossly intact  Skin:   Inspection and palpation: acanthosis nigricans and skin tag (acrochordons)  Nails: Nails are normal  Hair Texture: hair is normal        ASSESSMENT AND PLAN      1. Type 2 diabetes mellitus with hyperglycemia, without long-term current use of insulin  Explained the pathophysiology of diabetes and therapeutic options available. Discussed the importance of maintaining euglycemia. Patient instructed to take medications as directed. Emphasized importance of healthy diet with main emphasis on diet and carbohydrate counting. Encouraged active lifestyle with increased activity. Discouraged smoking. Patient understands to monitor for wound healing and examine feet routinely. Patients understands to test glucose. Patient is testing glucose 0 times daily.   Recommend daily exercise and/or increased activity. Lifestyle changes encouraged.  Patient admits to not doing her normal routine, we will restart Ozempic and plan to repeat labs in 3 months.  Encouraged patient to work on meal prepping and encouraged her to try to get these 10 minutes of exercise a day.  -     OZEMPIC 1 mg/dose (2 mg/1.5 mL) PnIj; Inject 1 mg into the skin once a week.  Dispense: 1 pen; Refill: 5  -     Comprehensive Metabolic Panel; Future; Expected date: 02/07/2023  -     Hemoglobin A1C; Future; Expected date: 02/07/2023  -     Microalbumin/Creatinine Ratio, Urine;  Future; Expected date: 02/07/2023    2. Hypertension, unspecified type  Patient understands pathophysiology of high blood pressure. Patient agrees to take meds as directed. Patient is asked to monitor blood pressure at home at random and send in BP diary if changes are made to treatment plan so adjustments can be made, if necessary, within 1-2 weeks. Patient will notify us if any symptoms occur including but not limited to dizziness, faint feeling, headache, blurred vision or chest pain or go to nearest ER if necessary.  Blood pressure is reasonably controlled on current medication  -     hydroCHLOROthiazide (MICROZIDE) 12.5 mg capsule; Take 1 capsule (12.5 mg total) by mouth once daily.  Dispense: 30 capsule; Refill: 5    3. Anxiety and depression   Discussed different aspects of depression. Patient denies suicidal or homicidal ideation. Patient should also consider counseling services and community resources. Advised patient on how medications work and how to take medication(s). Patient was advised to call or come in if symptoms develop. Patient verbalized understanding.Patient understands to seek immediate care if suicidal thoughts occur. Patient will follow up as directed.  Patient is taking maximum doses Cymbalta with no help on anxiety depression and fibromyalgia.  Discuss trying Effexor to see if this would be a better option.  Has tried and failed Wellbutrin.  Agree with patient that taking some time off of work could help her take care of herself and better help her deal with her mother.  We will fill out FMLA requesting 6-8 weeks of personal time so she can work on self-care.  Agree with patient going back to therapy  -     venlafaxine (EFFEXOR-XR) 75 MG 24 hr capsule; Take 1 capsule (75 mg total) by mouth once daily.  Dispense: 30 capsule; Refill: 11  -     ALPRAZolam (XANAX) 0.5 MG tablet; Take 1 tablet (0.5 mg total) by mouth once daily.  Dispense: 30 tablet; Refill: 5    4. Acute stress disorder    worsening, increased stress, on medications.  Advised patient to make changes to medications as discussed and she will let me know how she does.Patient advised to identify triggers for anxiety and consider the impact of anxious thinking on functioning. Discussed strategies to regulate symptoms and need for compliance with treatment. Patient understands therapy or counseling is necessary .   -     ALPRAZolam (XANAX) 0.5 MG tablet; Take 1 tablet (0.5 mg total) by mouth once daily.  Dispense: 30 tablet; Refill: 5    5. Hyperlipidemia, unspecified hyperlipidemia type  Reviewed importance of advanced lipid profiles. Advised daily exercise. Diet was recommended. Encouraged patient to obtain healthy BMI weight. Discussed with patient risks associated with high cholesterol. Goal LDL particle number is <1000 and ApoB <70. Reviewed important non-FDA approved dietary supplements that may be beneficial to patient including but not limited to high fiber diet at least 30g daily; niacin ER non flush free variety 500mg-1,000mg; Omega 3 fish oil DHA+EPA =1,000mg twice daily; baby dose aspirin 81mg; co-enzyme q10 500mg to help reduce statin-induced myalgia; red rice yeast 1200mg twice daily; berberine 1000mg-2000mg daily. Also discussed alternate medications and its role in treatment in cholesterol disorders.  We will monitor for now, patient does not take medications currently.  If elevated in 3 months, we will discuss need for medication.  -     Lipid Panel; Future; Expected date: 02/07/2023    6. Fibromyalgia    Worsening due to stress.  Continue to work on massage heat and self relaxation.  Okay to take magnesium daily.  We will add muscle relaxant.  -     venlafaxine (EFFEXOR-XR) 75 MG 24 hr capsule; Take 1 capsule (75 mg total) by mouth once daily.  Dispense: 30 capsule; Refill: 11  -     tiZANidine (ZANAFLEX) 4 MG tablet; Take 1 tablet (4 mg total) by mouth every 6 (six) hours as needed (pain).  Dispense: 60 tablet; Refill:  0  -     cyanocobalamin 1,000 mcg/mL injection; Inject 1 mL (1,000 mcg total) into the skin every 30 days.  Dispense: 1 mL; Refill: 5  -     celecoxib (CELEBREX) 100 MG capsule; Take 1 capsule (100 mg total) by mouth once daily.  Dispense: 30 capsule; Refill: 5    7. Sleep disorder  Advised good sleep habits and patterns to include: 1. Setting a goal for at least 7 to 8 hours of sleep time per day. 2. Using the bed mainly for sleep and to go to bed only when tired. If unable to fall asleep after 30 minutes, patient should get out of bed but should not engage in any activity that requires sustained mental alertness. 3. Maintaining a regular bedtime and wake-up time even on weekends or days off of work. 4. Avoiding excessive naps during the daytime. If a nap is necessary, limit it to no more than 30 minutes. 5. Minimizing environmental noise, bright lights, and extremes in bedroom temperature. 6. Avoiding alcohol, caffeinated beverages, and nicotine products for at least 6 hours prior to bedtime. 7. Avoiding strenuous exercise and large meals for at least 4 hours prior to bedtime.   -     eszopiclone (LUNESTA) 2 MG Tab; Take 1 tablet (2 mg total) by mouth nightly.  Dispense: 30 tablet; Refill: 5    8. Nausea and vomiting   Takes as needed  -     ondansetron (ZOFRAN) 4 MG tablet; Take 1 tablet (4 mg total) by mouth every 8 (eight) hours as needed for Nausea.  Dispense: 20 tablet; Refill: 1    9. Hypothyroidism, unspecified type  Patient understands thyroid disorder and importance of medication compliance. Pt understands to take thyroid meds on empty stomach and avoid taking with iron, calcium, zinc and fiber. Patient understands potential risks associated with suppressing TSH (increased arrhythmia and bone loss) while emphasizing emphasis on improving patient symptoms. Patient understands to notify us if any symptoms occur suggestive of overactive thyroid (fast heart rate, anxiety, sleeplessness, and tremors).  TSH is  suboptimal, and patient has many symptoms of low thyroid.  We will increase Synthroid and repeat labs in 3 months  -     levothyroxine (SYNTHROID) 75 MCG tablet; Take 1 tablet (75 mcg total) by mouth before breakfast.  Dispense: 30 tablet; Refill: 11  -     liothyronine (CYTOMEL) 5 MCG Tab; Take 1 tablet (5 mcg total) by mouth 3 (three) times daily.  Dispense: 90 tablet; Refill: 5  -     Misc Sendout Test, Blood free t3; Future; Expected date: 02/07/2023  -     T4, Free; Future; Expected date: 02/07/2023  -     TSH; Future; Expected date: 02/07/2023  -     Thyroid Peroxidase Antibody; Future; Expected date: 02/07/2023         FOLLOW-UP:  No follow-ups on file.    I spent a total of 42 minutes face to face and non-face to face on the date of this visit.This includes time preparing to see the patient (eg, review of tests, notes), obtaining and/or reviewing additional history from an independent historian and/or outside medical records, documenting clinical information in the electronic health record, independently interpreting results and/or communicating results to the patient/family/caregiver, or care coordinator.    Signed by:  Allegra Cruz MD

## 2023-02-15 ENCOUNTER — DOCUMENTATION ONLY (OUTPATIENT)
Dept: TRANSPLANT | Facility: CLINIC | Age: 53
End: 2023-02-15
Payer: COMMERCIAL

## 2023-02-15 ENCOUNTER — OFFICE VISIT (OUTPATIENT)
Dept: TRANSPLANT | Facility: CLINIC | Age: 53
End: 2023-02-15
Attending: INTERNAL MEDICINE
Payer: COMMERCIAL

## 2023-02-15 VITALS
HEART RATE: 84 BPM | HEIGHT: 62 IN | WEIGHT: 221.81 LBS | SYSTOLIC BLOOD PRESSURE: 128 MMHG | BODY MASS INDEX: 40.82 KG/M2 | DIASTOLIC BLOOD PRESSURE: 79 MMHG

## 2023-02-15 DIAGNOSIS — Z83.49 FAMILY HISTORY OF AMYLOIDOSIS: Primary | ICD-10-CM

## 2023-02-15 DIAGNOSIS — G56.03 CARPAL TUNNEL SYNDROME, BILATERAL: ICD-10-CM

## 2023-02-15 PROBLEM — G47.33 OSA ON CPAP: Status: ACTIVE | Noted: 2023-02-15

## 2023-02-15 PROCEDURE — 99999 PR PBB SHADOW E&M-EST. PATIENT-LVL IV: ICD-10-PCS | Mod: PBBFAC,,, | Performed by: INTERNAL MEDICINE

## 2023-02-15 PROCEDURE — 99203 OFFICE O/P NEW LOW 30 MIN: CPT | Mod: S$GLB,,, | Performed by: INTERNAL MEDICINE

## 2023-02-15 PROCEDURE — 99999 PR PBB SHADOW E&M-EST. PATIENT-LVL IV: CPT | Mod: PBBFAC,,, | Performed by: INTERNAL MEDICINE

## 2023-02-15 PROCEDURE — 99203 PR OFFICE/OUTPT VISIT, NEW, LEVL III, 30-44 MIN: ICD-10-PCS | Mod: S$GLB,,, | Performed by: INTERNAL MEDICINE

## 2023-02-15 RX ORDER — MONTELUKAST SODIUM 10 MG/1
10 TABLET ORAL
COMMUNITY
Start: 2023-02-15

## 2023-02-15 NOTE — NURSING
2/15/23: Met with Ms. Carmen Sheikh after her appt with Dr. Smith for hTTR evaluation as her mother has tested for Karla 122Ile. Agreed to TTR/DNA testing with [facility] at no cost to patient via buccal Swab. Amyloidosis educational literature provided as requested. Explained results will take about 3 weeks and I will call her with results. Verbalized understanding.

## 2023-02-15 NOTE — PROGRESS NOTES
Subjective:     Patient ID:  Madeleine Sheikh is a 52 y.o. female who presents for evaluation of Mother with h-TTR amyloidosis    HPI:  51 yo BF referred herself to undergo an assessment due to a family history with her mother having h-TTR amyloidosis.  She denies dyspnea on exertion orthopnea or PND.  No symptomatic arrhythmia.  No unexplained weight loss, no nausea vomiting, no diarrhea constipation.  She does report burning and tingling in the soles of both feet and in the anterior portion of the left thigh.  She has some mild dizzy/lightheaded sensation when she stands.    She has a history of bilateral carpal tunnel syndrome    She has had fibromyalgia since     Past Medical History:   Diagnosis Date    Carpal tunnel syndrome, bilateral     CKD (chronic kidney disease) stage 3, GFR 30-59 ml/min     Diabetes mellitus     Fibromyalgia     Hypertension     Migraine     GARRY on CPAP        Past Surgical History:   Procedure Laterality Date    APPENDECTOMY       SECTION      CHOLECYSTECTOMY  1997    denture implants N/A     DILATION AND CURETTAGE OF UTERUS      GASTRIC BYPASS  2004    HYSTEROSCOPY WITH DILATION AND CURETTAGE OF UTERUS      KNEE ARTHROSCOPY Left 2009       Family History   Problem Relation Age of Onset    Heart disease Mother         Congestive heart failure due to hereditary TTR amyloidosis    Breast cancer Paternal Aunt        Social History     Socioeconomic History    Marital status:    Tobacco Use    Smoking status: Every Day     Types: Cigarettes    Smokeless tobacco: Never   Substance and Sexual Activity    Alcohol use: No    Drug use: Never    Sexual activity: Yes     Comment: Occasional       Current Outpatient Medications   Medication Sig Dispense Refill    ALPRAZolam (XANAX) 0.5 MG tablet Take 1 tablet (0.5 mg total) by mouth once daily. 30 tablet 5    azelastine (OPTIVAR) 0.05 % ophthalmic solution Apply 1 drop to eye 2 (two) times daily as needed.       celecoxib (CELEBREX) 100 MG capsule Take 1 capsule (100 mg total) by mouth once daily. 30 capsule 5    cyanocobalamin 1,000 mcg/mL injection Inject 1 mL (1,000 mcg total) into the skin every 30 days. 1 mL 5    eszopiclone (LUNESTA) 2 MG Tab Take 1 tablet (2 mg total) by mouth nightly. 30 tablet 5    fluticasone propionate (FLONASE) 50 mcg/actuation nasal spray 1 spray (50 mcg total) by Each Nostril route once daily. 16 g 0    galcanezumab-gnlm (EMGALITY PEN) 120 mg/mL PnIj Inject 120 mg into the skin.      hydroCHLOROthiazide (MICROZIDE) 12.5 mg capsule Take 1 capsule (12.5 mg total) by mouth once daily. 30 capsule 5    levothyroxine (SYNTHROID) 75 MCG tablet Take 1 tablet (75 mcg total) by mouth before breakfast. 30 tablet 11    liothyronine (CYTOMEL) 5 MCG Tab Take 1 tablet (5 mcg total) by mouth 3 (three) times daily. 90 tablet 5    montelukast (SINGULAIR) 10 mg tablet Take 10 mg by mouth.      ondansetron (ZOFRAN) 4 MG tablet Take 1 tablet (4 mg total) by mouth every 8 (eight) hours as needed for Nausea. 20 tablet 1    OZEMPIC 1 mg/dose (2 mg/1.5 mL) PnIj Inject 1 mg into the skin once a week. 1 pen 5    pantoprazole (PROTONIX) 40 MG tablet Take 1 tablet every day by oral route for 90 days.      tiZANidine (ZANAFLEX) 4 MG tablet Take 1 tablet (4 mg total) by mouth every 6 (six) hours as needed (pain). 60 tablet 0    TROKENDI XR 50 mg Cp24 Take 1 tablet by mouth once daily.      UBRELVY 100 mg tablet Take by mouth.      varenicline (CHANTIX) 1 mg Tab TAKE 1 TABLET BY MOUTH TWICE DAILY 56 tablet 0    venlafaxine (EFFEXOR-XR) 75 MG 24 hr capsule Take 1 capsule (75 mg total) by mouth once daily. 30 capsule 11    erenumab-aooe (AIMOVIG AUTOINJECTOR SUBQ) every 30 days.        No current facility-administered medications for this visit.       Review of patient's allergies indicates:   Allergen Reactions    Amlodipine Swelling    Augmentin [amoxicillin-pot clavulanate] Anaphylaxis and Other (See Comments)     Liver  "issues       Azithromycin Swelling and Nausea And Vomiting    Doxycycline Shortness Of Breath and Nausea And Vomiting    Morphine Hallucinations and Shortness Of Breath     hallucinations      Sumatriptan Swelling    Tioconazole Hives    Bupropion hcl     Canagliflozin Other (See Comments)     Other reaction(s): Other (See Comments)    Doxycycline monohydrate     Morphine sulfate      Review of Systems   Constitutional: Negative for decreased appetite and weight loss.   Cardiovascular:  Negative for chest pain, dyspnea on exertion, irregular heartbeat, leg swelling, near-syncope, orthopnea, palpitations, paroxysmal nocturnal dyspnea and syncope.   Respiratory:  Positive for sleep disturbances due to breathing (uses CPAP).    Musculoskeletal:         Fibromyalgia   Gastrointestinal:  Negative for constipation, diarrhea, nausea and vomiting.   Genitourinary:  Negative for bladder incontinence.   Neurological:  Positive for dizziness (occasional), headaches (migraines), light-headedness (occasional) and paresthesias (burning in the left thigh; burning in the soles of both feet). Negative for brief paralysis, focal weakness and weakness.      Objective:   Physical Exam  Constitutional:       General: She is not in acute distress.     Appearance: She is well-developed. She is not ill-appearing, toxic-appearing or diaphoretic.      Comments: /79 (BP Location: Left arm, Patient Position: Sitting, BP Method: Large (Automatic))   Pulse 84   Ht 5' 2" (1.575 m)   Wt 100.6 kg (221 lb 12.5 oz)   LMP 09/10/2019   BMI 40.56 kg/m²   Obese friendly black female in no acute distress   HENT:      Head: Normocephalic and atraumatic.   Eyes:      General: No scleral icterus.        Right eye: No discharge.         Left eye: No discharge.      Conjunctiva/sclera: Conjunctivae normal.   Neck:      Thyroid: No thyromegaly.      Vascular: No JVD.      Trachea: No tracheal deviation.   Cardiovascular:      Rate and Rhythm: Normal " rate and regular rhythm.      Heart sounds: Normal heart sounds. No murmur heard.    No gallop.   Pulmonary:      Effort: Pulmonary effort is normal.      Breath sounds: Normal breath sounds.   Abdominal:      General: Bowel sounds are normal. There is no distension.      Palpations: Abdomen is soft. There is no mass.      Tenderness: There is no abdominal tenderness. There is no guarding or rebound.   Musculoskeletal:         General: No tenderness.      Right lower leg: No edema.      Left lower leg: No edema.   Skin:     General: Skin is warm and dry.   Neurological:      General: No focal deficit present.      Mental Status: She is alert and oriented to person, place, and time. Mental status is at baseline.   Psychiatric:         Mood and Affect: Mood normal.         Behavior: Behavior normal.         Thought Content: Thought content normal.         Judgment: Judgment normal.      Assessment:     1. Family history of TTR amyloidosis    2. Carpal tunnel syndrome, bilateral      Plan:   She is a family history of TTR amyloidosis but is not sure of the mutation.  She has bilateral carpal tunnel syndrome.  Some burning in the soles of both feet but not clear that she has a true neuropathy.  The burning in her thigh does not fit with an amyloid neuropathy.  She has no heart failure symptoms.  We discussed that getting tested if positive would negatively impact her ability to get life insurance or disability insurance.  Therefore I recommended she get these insurance is in place for 1 genetic testing.  Genetic testing him meet limited solely to the amyloid panel done with a buccal swab.  She would like to proceed today.    If she has mutation will then get EKG, echocardiogram and PYP scan.  If she does not have mutation no further cardiac evaluation is necessary.    She sees Dr. Tayo Diamond in [place] for her migraines and has had an evaluation for neuropathy in the past.  If she has mutation I will ask that  he evaluate her for amyloid polyneuropathy.

## 2023-03-13 ENCOUNTER — TELEPHONE (OUTPATIENT)
Dept: TRANSPLANT | Facility: CLINIC | Age: 53
End: 2023-03-13
Payer: COMMERCIAL

## 2023-03-13 NOTE — TELEPHONE ENCOUNTER
3/13/23: Contacted Ms. 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 Copy of the test results will be mailed to her.    Dr. Smith notified as well, therefore informed her that EKG, echo and follow up neurologist for EMG of lower extremities would be scheduled. Asked her if she would prefer to see her neurologist in [place], Dr. Tayo Diamond whom she sees for migraines or one at [facility]. She indicated that she has no preference, but only who could do it the earliest even though she not longer lives in [place] any longer.    Informed her that we will try to get everything as early as possible and will notify her of the available dates. Verbalized understanding of all instructions

## 2023-03-17 DIAGNOSIS — G47.9 SLEEP DISORDER: ICD-10-CM

## 2023-03-17 RX ORDER — ESZOPICLONE 2 MG/1
2 TABLET, FILM COATED ORAL NIGHTLY
Qty: 90 TABLET | Refills: 1 | Status: SHIPPED | OUTPATIENT
Start: 2023-03-17 | End: 2023-05-18 | Stop reason: SDUPTHER

## 2023-03-17 RX ORDER — NALTREXONE 100 %
POWDER (GRAM) MISCELLANEOUS
Qty: 90 EACH | Refills: 1 | Status: SHIPPED | OUTPATIENT
Start: 2023-03-17 | End: 2023-05-18

## 2023-04-03 ENCOUNTER — CLINICAL SUPPORT (OUTPATIENT)
Dept: SMOKING CESSATION | Facility: CLINIC | Age: 53
End: 2023-04-03

## 2023-04-03 DIAGNOSIS — F17.200 NICOTINE DEPENDENCE: Primary | ICD-10-CM

## 2023-04-03 PROCEDURE — 99407 BEHAV CHNG SMOKING > 10 MIN: CPT | Mod: S$GLB,,,

## 2023-04-03 PROCEDURE — 99999 PR PBB SHADOW E&M-EST. PATIENT-LVL I: CPT | Mod: PBBFAC,,,

## 2023-04-03 PROCEDURE — 99407 PR TOBACCO USE CESSATION INTENSIVE >10 MINUTES: ICD-10-PCS | Mod: S$GLB,,,

## 2023-04-03 PROCEDURE — 99999 PR PBB SHADOW E&M-EST. PATIENT-LVL I: ICD-10-PCS | Mod: PBBFAC,,,

## 2023-04-03 NOTE — PROGRESS NOTES
Spoke with patient today in regard to smoking cessation progress for 3 month telephone follow up, she states not tobacco free. Patient states not ready to return to the program at this time and will call when ready. Informed patient of benefit period, future follow ups, and contact information if any further help or support is needed. Will complete smart form for 3 month follow up on Quit attempt #1.

## 2023-04-28 DIAGNOSIS — G63: ICD-10-CM

## 2023-04-28 DIAGNOSIS — Z83.49 FAMILY HISTORY OF AMYLOIDOSIS: Primary | ICD-10-CM

## 2023-04-28 DIAGNOSIS — E85.1: ICD-10-CM

## 2023-05-09 ENCOUNTER — LAB VISIT (OUTPATIENT)
Dept: LAB | Facility: HOSPITAL | Age: 53
End: 2023-05-09
Attending: FAMILY MEDICINE
Payer: COMMERCIAL

## 2023-05-09 DIAGNOSIS — E03.9 HYPOTHYROIDISM, UNSPECIFIED TYPE: ICD-10-CM

## 2023-05-09 DIAGNOSIS — E78.5 HYPERLIPIDEMIA, UNSPECIFIED HYPERLIPIDEMIA TYPE: ICD-10-CM

## 2023-05-09 DIAGNOSIS — E11.65 TYPE 2 DIABETES MELLITUS WITH HYPERGLYCEMIA, WITHOUT LONG-TERM CURRENT USE OF INSULIN: ICD-10-CM

## 2023-05-09 LAB
ALBUMIN SERPL BCP-MCNC: 3.2 G/DL (ref 3.5–5.2)
ALP SERPL-CCNC: 65 U/L (ref 55–135)
ALT SERPL W/O P-5'-P-CCNC: 16 U/L (ref 10–44)
ANION GAP SERPL CALC-SCNC: 8 MMOL/L (ref 8–16)
AST SERPL-CCNC: 25 U/L (ref 10–40)
BILIRUB SERPL-MCNC: 0.5 MG/DL (ref 0.1–1)
BUN SERPL-MCNC: 11 MG/DL (ref 6–20)
CALCIUM SERPL-MCNC: 9.4 MG/DL (ref 8.7–10.5)
CHLORIDE SERPL-SCNC: 103 MMOL/L (ref 95–110)
CHOLEST SERPL-MCNC: 194 MG/DL (ref 120–199)
CHOLEST/HDLC SERPL: 3.3 {RATIO} (ref 2–5)
CO2 SERPL-SCNC: 28 MMOL/L (ref 23–29)
CREAT SERPL-MCNC: 1 MG/DL (ref 0.5–1.4)
EST. GFR  (NO RACE VARIABLE): >60 ML/MIN/1.73 M^2
ESTIMATED AVG GLUCOSE: 105 MG/DL (ref 68–131)
GLUCOSE SERPL-MCNC: 73 MG/DL (ref 70–110)
HBA1C MFR BLD: 5.3 % (ref 4–5.6)
HDLC SERPL-MCNC: 59 MG/DL (ref 40–75)
HDLC SERPL: 30.4 % (ref 20–50)
LDLC SERPL CALC-MCNC: 120.2 MG/DL (ref 63–159)
NONHDLC SERPL-MCNC: 135 MG/DL
POTASSIUM SERPL-SCNC: 4.1 MMOL/L (ref 3.5–5.1)
PROT SERPL-MCNC: 6.5 G/DL (ref 6–8.4)
SODIUM SERPL-SCNC: 139 MMOL/L (ref 136–145)
T4 FREE SERPL-MCNC: 0.75 NG/DL (ref 0.71–1.51)
THYROPEROXIDASE IGG SERPL-ACNC: <6 IU/ML
TRIGL SERPL-MCNC: 74 MG/DL (ref 30–150)
TSH SERPL DL<=0.005 MIU/L-ACNC: 0.71 UIU/ML (ref 0.4–4)

## 2023-05-09 PROCEDURE — 80053 COMPREHEN METABOLIC PANEL: CPT | Performed by: FAMILY MEDICINE

## 2023-05-09 PROCEDURE — 80061 LIPID PANEL: CPT | Performed by: FAMILY MEDICINE

## 2023-05-09 PROCEDURE — 84439 ASSAY OF FREE THYROXINE: CPT | Performed by: FAMILY MEDICINE

## 2023-05-09 PROCEDURE — 36415 COLL VENOUS BLD VENIPUNCTURE: CPT | Performed by: FAMILY MEDICINE

## 2023-05-09 PROCEDURE — 83036 HEMOGLOBIN GLYCOSYLATED A1C: CPT | Performed by: FAMILY MEDICINE

## 2023-05-09 PROCEDURE — 84443 ASSAY THYROID STIM HORMONE: CPT | Performed by: FAMILY MEDICINE

## 2023-05-09 PROCEDURE — 86376 MICROSOMAL ANTIBODY EACH: CPT | Performed by: FAMILY MEDICINE

## 2023-05-10 ENCOUNTER — TELEPHONE (OUTPATIENT)
Dept: PRIMARY CARE CLINIC | Facility: CLINIC | Age: 53
End: 2023-05-10
Payer: COMMERCIAL

## 2023-05-10 NOTE — TELEPHONE ENCOUNTER
----- Message from Radha Tirado sent at 5/10/2023  2:52 PM CDT -----  Regarding: Lab test cancellation  There was an issue with the Free T3 test ordered on this patient from 5/9/2023.    Unfortunately this test is not orderable per institutional guidelines so the order has been cancelled.    Please call the [facility] lab at [phone] ext. [phone] if there are any questions.  Anyone in the [facility] lab will be able to assist you.

## 2023-05-18 ENCOUNTER — OFFICE VISIT (OUTPATIENT)
Dept: PRIMARY CARE CLINIC | Facility: CLINIC | Age: 53
End: 2023-05-18
Payer: COMMERCIAL

## 2023-05-18 VITALS
BODY MASS INDEX: 39.15 KG/M2 | TEMPERATURE: 98 F | HEIGHT: 62 IN | OXYGEN SATURATION: 97 % | DIASTOLIC BLOOD PRESSURE: 78 MMHG | SYSTOLIC BLOOD PRESSURE: 126 MMHG | HEART RATE: 72 BPM | WEIGHT: 212.75 LBS | RESPIRATION RATE: 16 BRPM

## 2023-05-18 DIAGNOSIS — F32.A ANXIETY AND DEPRESSION: ICD-10-CM

## 2023-05-18 DIAGNOSIS — E85.2 FAMILIAL AMYLOIDOSIS: ICD-10-CM

## 2023-05-18 DIAGNOSIS — G47.9 SLEEP DISORDER: ICD-10-CM

## 2023-05-18 DIAGNOSIS — G47.33 OSA ON CPAP: ICD-10-CM

## 2023-05-18 DIAGNOSIS — F41.9 ANXIETY AND DEPRESSION: ICD-10-CM

## 2023-05-18 DIAGNOSIS — H81.10 BENIGN PAROXYSMAL POSITIONAL VERTIGO, UNSPECIFIED LATERALITY: ICD-10-CM

## 2023-05-18 DIAGNOSIS — K29.70 GASTRITIS, PRESENCE OF BLEEDING UNSPECIFIED, UNSPECIFIED CHRONICITY, UNSPECIFIED GASTRITIS TYPE: ICD-10-CM

## 2023-05-18 DIAGNOSIS — M79.7 FIBROMYALGIA: ICD-10-CM

## 2023-05-18 DIAGNOSIS — I10 HYPERTENSION, UNSPECIFIED TYPE: ICD-10-CM

## 2023-05-18 DIAGNOSIS — E78.5 HYPERLIPIDEMIA, UNSPECIFIED HYPERLIPIDEMIA TYPE: Primary | ICD-10-CM

## 2023-05-18 DIAGNOSIS — E11.65 TYPE 2 DIABETES MELLITUS WITH HYPERGLYCEMIA, WITHOUT LONG-TERM CURRENT USE OF INSULIN: ICD-10-CM

## 2023-05-18 DIAGNOSIS — E06.3 HASHIMOTO'S THYROIDITIS: ICD-10-CM

## 2023-05-18 PROBLEM — Z98.84 HISTORY OF BARIATRIC SURGERY: Status: ACTIVE | Noted: 2017-11-21

## 2023-05-18 PROBLEM — N18.2 STAGE 2 CHRONIC KIDNEY DISEASE: Status: ACTIVE | Noted: 2019-01-03

## 2023-05-18 PROCEDURE — 99999 PR PBB SHADOW E&M-EST. PATIENT-LVL V: ICD-10-PCS | Mod: PBBFAC,,, | Performed by: FAMILY MEDICINE

## 2023-05-18 PROCEDURE — 99999 PR PBB SHADOW E&M-EST. PATIENT-LVL V: CPT | Mod: PBBFAC,,, | Performed by: FAMILY MEDICINE

## 2023-05-18 PROCEDURE — 99215 PR OFFICE/OUTPT VISIT, EST, LEVL V, 40-54 MIN: ICD-10-PCS | Mod: S$GLB,,, | Performed by: FAMILY MEDICINE

## 2023-05-18 PROCEDURE — 99215 OFFICE O/P EST HI 40 MIN: CPT | Mod: S$GLB,,, | Performed by: FAMILY MEDICINE

## 2023-05-18 RX ORDER — SEMAGLUTIDE 2.68 MG/ML
2 INJECTION, SOLUTION SUBCUTANEOUS
Qty: 9 ML | Refills: 1 | Status: SHIPPED | OUTPATIENT
Start: 2023-05-18 | End: 2023-12-08 | Stop reason: SDUPTHER

## 2023-05-18 RX ORDER — PANTOPRAZOLE SODIUM 40 MG/1
40 TABLET, DELAYED RELEASE ORAL DAILY
Qty: 90 TABLET | Refills: 1 | Status: SHIPPED | OUTPATIENT
Start: 2023-05-18 | End: 2023-12-08 | Stop reason: SDUPTHER

## 2023-05-18 RX ORDER — ESZOPICLONE 2 MG/1
2 TABLET, FILM COATED ORAL NIGHTLY
Qty: 90 TABLET | Refills: 1 | Status: SHIPPED | OUTPATIENT
Start: 2023-05-18 | End: 2023-12-08

## 2023-05-18 RX ORDER — MECLIZINE HYDROCHLORIDE 25 MG/1
25 TABLET ORAL
COMMUNITY
Start: 2023-05-16

## 2023-05-18 RX ORDER — PREDNISONE 10 MG/1
10 TABLET ORAL 2 TIMES DAILY
COMMUNITY
Start: 2023-05-16 | End: 2023-12-08

## 2023-05-18 RX ORDER — MONTELUKAST SODIUM 10 MG/1
1 TABLET ORAL EVERY MORNING
COMMUNITY
Start: 2023-05-16 | End: 2023-05-18

## 2023-05-18 NOTE — PROGRESS NOTES
"    [facility]       [address] [place] [address]      [phone]     Allegra Cruz MD         .      Office Visit  2023  [id number]      SUBJECTIVE     HPI:  Madeleine Sheikh is a 52 y.o. female presents today in clinic for "Follow-up  ."   [place] [facility] [place] hx of DM, FMG, anxiety and depression, and hashimotos.  Meds have been stable- still wants to lose more weight. On ozempic and has done well this foar.  Has a lot of stress with elderly mom- managing moms health. Patient is wanting to start juicing to help with her thyroid and health overall.  Does take LDN and thinks this is better.  Patient was also just tested for familiar amylase day cyst, seeing Dr. Godwin, as there seems to be a very strong history of amyloidosis.  She is still undergoing further testing, since treatment has not been decided at this time.  She does have history of chronic migraines, which is currently controlled with monthly injection in as needed Ubrelvy.  She is no longer taking Trokendi.  She has lost weight with Ozempic, but still wants to lose weight    Follow-up      ROS   Review of symptoms are negative except as noted.     PAST MEDICAL HISTORY     Past Medical History:   Diagnosis Date    Carpal tunnel syndrome, bilateral     CKD (chronic kidney disease) stage 3, GFR 30-59 ml/min     Diabetes mellitus     Fibromyalgia     Hereditary amyloidosis 2023    Hypertension     Migraine     GARRY on CPAP     Vertigo        Past Surgical History:   Procedure Laterality Date    APPENDECTOMY  1975     SECTION      CHOLECYSTECTOMY      denture implants N/A     DILATION AND CURETTAGE OF UTERUS      GASTRIC BYPASS  2004    HYSTEROSCOPY WITH DILATION AND CURETTAGE OF UTERUS      KNEE ARTHROSCOPY Left 2009       Social History     Socioeconomic History    Marital status:    Tobacco Use    Smoking status: Former     " Types: Cigarettes     Quit date: 2023     Years since quittin.2    Smokeless tobacco: Never   Substance and Sexual Activity    Alcohol use: No    Drug use: Never    Sexual activity: Yes     Comment: Occasional       Allergies as of 2023 - Reviewed 2023   Allergen Reaction Noted    Amlodipine Swelling 10/10/2019    Augmentin [amoxicillin-pot clavulanate] Anaphylaxis and Other (See Comments) 2018    Azithromycin Swelling and Nausea And Vomiting 2012    Doxycycline Shortness Of Breath and Nausea And Vomiting 2012    Morphine Hallucinations and Shortness Of Breath 1994    Sumatriptan Swelling 2012    Tioconazole Hives 2012    Bupropion hcl  2022    Canagliflozin Other (See Comments) 2018    Doxycycline monohydrate  2022    Morphine sulfate  2022       HOME MEDS     Current Outpatient Medications   Medication Instructions    ALPRAZolam (XANAX) 0.5 mg, Oral, Daily    azelastine (OPTIVAR) 0.05 % ophthalmic solution 1 drop, Ophthalmic, 2 times daily PRN    celecoxib (CELEBREX) 100 mg, Oral, Daily    cyanocobalamin 1,000 mcg, Subcutaneous, Every 30 days    EMGALITY  mg, Subcutaneous    eszopiclone (LUNESTA) 2 mg, Oral, Nightly    hydroCHLOROthiazide (MICROZIDE) 12.5 mg, Oral, Daily    levothyroxine (SYNTHROID) 75 mcg, Oral, Before breakfast    liothyronine (CYTOMEL) 5 mcg, Oral, 3 times daily    meclizine (ANTIVERT) 25 mg, Oral    montelukast (SINGULAIR) 10 mg, Oral    naltrexone capsule 4.5 mg, Oral, Nightly    ondansetron (ZOFRAN) 4 mg, Oral, Every 8 hours PRN    pantoprazole (PROTONIX) 40 MG tablet Take 1 tablet every day by oral route for 90 days.    predniSONE (DELTASONE) 10 mg, Oral, 2 times daily    UBRELVY 100 mg tablet Oral    venlafaxine (EFFEXOR-XR) 75 mg, Oral, Daily       The following were updated and reviewed by myself in the chart: medications, past medical history, past surgical history, family history, social history,  "and allergies.        Objective    OBJECTIVE   /78   Pulse 72   Temp 97.7 °F (36.5 °C)   Resp 16   Ht 5' 2" (1.575 m)   Wt 96.5 kg (212 lb 11.9 oz)   LMP 09/10/2019   SpO2 97%   BMI 38.91 kg/m²     Wt Readings from Last 2 Encounters:   05/18/23 96.5 kg (212 lb 11.9 oz)   02/15/23 100.6 kg (221 lb 12.5 oz)       BP Readings from Last 2 Encounters:   05/18/23 126/78   02/15/23 128/79          Physical Exam  Vitals and nursing note reviewed.   Constitutional:       Appearance: Normal appearance. She is obese.   HENT:      Head: Normocephalic and atraumatic.      Nose: Nose normal.   Eyes:      Extraocular Movements: Extraocular movements intact.      Conjunctiva/sclera: Conjunctivae normal.      Pupils: Pupils are equal, round, and reactive to light.   Neck:      Comments: Increased neck circumference  Cardiovascular:      Rate and Rhythm: Normal rate and regular rhythm.   Pulmonary:      Effort: Pulmonary effort is normal.      Breath sounds: Normal breath sounds.   Abdominal:      General: Abdomen is flat.      Palpations: Abdomen is soft.      Tenderness: There is no abdominal tenderness.      Comments: Increased abd girth   Musculoskeletal:         General: No swelling or tenderness. Normal range of motion.      Cervical back: Normal range of motion.   Lymphadenopathy:      Cervical: No cervical adenopathy.   Skin:     General: Skin is warm.      Findings: No lesion or rash.      Comments: Skin tags   Neurological:      General: No focal deficit present.      Mental Status: She is alert. Mental status is at baseline.   Psychiatric:         Mood and Affect: Mood normal.         Behavior: Behavior normal.             LABS      LAB RESULTS, IF AVAILABLE, ARE DISCUSSED WITH PATIENT AND POSTED TO PATIENT PORTAL     ASSESSMENT & PLAN     1. Hyperlipidemia, unspecified hyperlipidemia type  *Reviewed importance of advanced lipid profiles. Advise daily exercise. Diet is recommended. Patients are encouraged to " obtain healthy BMI weight. Risks associated with high cholesterol are well established and include but are not limited to heart disease, stroke and peripheral vascular disease. Patient should not smoke. Goal LDL particle number is <1000 and ApoB <70. Basic LDL is below 100 or below 70 if diabetic. Some non-FDA approved dietary supplements that may be beneficial to patient include but is not limited to high fiber diet at least 30g daily; niacin ER non flush free variety 500mg-1,000mg; Omega-3 fish oil DHA+EPA = 1,000mg twice daily; baby dose aspirin 81mg; co-enzyme q10 500mg to help reduce statin-induced myalgia; red rice yeast 1200mg twice daily; berberine 1000mg-2000mg daily. Patient is encouraged to exercise routinely and adhere to heart healthy diet with Mediterranean diet showing the most consistent data to help with lipid management.     2. Familial amyloidosis  Defer to Dr. Yash evans.    3. GARRY on CPAP  We will continue to work on healthy weight loss efforts    4. Fibromyalgia  Improved since starting low-dose naltrexone    5. Anxiety and depression  It is advised to identify triggers for anxiety and consider the impact of anxious thinking on functioning. Discussed strategies to regulate symptoms and need for compliance with treatment.  Therapy or counseling may be necessary if current regimen is ineffective. Patient will report any worsening or change in anxiety symptoms if necessary.  Patient is feeling better since having an eight week temporary leave of absence from work.  She is still taking Effexor, completed counseling.  She feels like she is in a better place.    6. Hypertension, unspecified type  Patient understands pathophysiology of high blood pressure. Patient should take meds as directed. Patient is asked to monitor blood pressure at home at random and send in BP diary if changes are made to treatment plan so adjustments can be made, if necessary, within 1-2 weeks of stopping, starting or changing  medications. Patient will notify us if any symptoms occur including but not limited to dizziness, faint feeling, headache, blurred vision or chest pain or go to nearest ER if necessary for evaluation.      7. Type 2 diabetes mellitus with hyperglycemia, without long-term Explained the pathophysiology of diabetes and therapeutic options available. Discussed the importance of maintaining euglycemia. Patient instructed to take medications as directed. Emphasized importance of healthy diet with main emphasis on diet and carbohydrate counting. Encourage active lifestyle with increased activity. Patients are discouraged from smoking. Patient should monitor for wound healing and examine feet routinely. Patients needs to test glucose as directed. Patient is testing glucose 0 times daily.   Recommend daily exercise and/or increased activity. Lifestyle changes encouraged. Patient needs to see eye doctor annually for diabetic eye exam.  We will increase to 2 mg to see if this can better help with her weight loss efforts.  A1c is well-controlled    8. Benign paroxysmal positional vertigo, unspecified laterality  Sees Dr. Bailey, now on Singulair and meclizine as needed.    9. Hashimoto's thyroiditis  Alternate remedies that can help with inflammation associated with Hashimoto's include such things such as Selenium 200mg supplementation, adhering to a gluten free diet as well as adding LDN as a non - FDA approved option for therapy. Patient should comply with taking medications as directed. Patient should take thyroid meds on empty stomach and avoid taking with iron, calcium, zinc and fiber.  Potential risks are associated with suppressing TSH (increased arrhythmia and bone loss) as emphasis is on improving patient symptoms. Patient should notify us if any symptoms occur suggesting  overactive thyroid (fast heart rate, anxiety, sleeplessness, and tremors).  Thyroid levels are optimal, no changes.    10. Sleep disorder  Risks  "associated with sleep medications are common and should be used cautiously; Patient should avoid taking with other medications that may have adverse effect with one another. Patient should avoid drinking alcohol while taking sleep medications. Patient should notify me if any side effects occur when taking these medications.  Prolonged use of sleep medications can develop dependence and/or addiction and we will monitor for these things. Patient advised to take meds only as directed to avoid adverse reaction.    Controlled on Lunesta, we will continue.  11. Gastritis, presence of bleeding unspecified, unspecified chronicity, unspecified gastritis type  On PPI, okay to try reducing to 1/2 to see if these can control his symptoms.  Use caution while going up on GLP therapy           I spent a total of 45 minutes face to face and non-face to face on the date of this visit.This includes time preparing to see the patient (eg, review of tests, notes), obtaining and/or reviewing additional history from an independent historian and/or outside medical records, documenting clinical information in the electronic health record, independently interpreting results and/or communicating results to the patient/family/caregiver, or care coordinator.      Disclaimer: Portions of this record may have been created with voice recognition software. Occasional wrong-word or "sound-a-like" substitutions may have occurred due to inherent limitations of voice recognition software. Read the chart carefully and recognize, using context, where substitutions have occurred."    Signed by:  Allegra Cruz MD    @[facility]@       "

## 2023-06-12 ENCOUNTER — TELEPHONE (OUTPATIENT)
Dept: CARDIOLOGY | Facility: HOSPITAL | Age: 53
End: 2023-06-12
Payer: COMMERCIAL

## 2023-06-14 ENCOUNTER — HOSPITAL ENCOUNTER (OUTPATIENT)
Dept: CARDIOLOGY | Facility: CLINIC | Age: 53
Discharge: HOME | End: 2023-06-14
Attending: INTERNAL MEDICINE
Payer: COMMERCIAL

## 2023-06-14 ENCOUNTER — HOSPITAL ENCOUNTER (OUTPATIENT)
Dept: CARDIOLOGY | Facility: HOSPITAL | Age: 53
Discharge: HOME | End: 2023-06-14
Attending: INTERNAL MEDICINE
Payer: COMMERCIAL

## 2023-06-14 DIAGNOSIS — Z83.49 FAMILY HISTORY OF AMYLOIDOSIS: ICD-10-CM

## 2023-06-14 DIAGNOSIS — G63: ICD-10-CM

## 2023-06-14 DIAGNOSIS — E85.1: ICD-10-CM

## 2023-06-14 PROCEDURE — 93010 ELECTROCARDIOGRAM REPORT: CPT | Mod: S$GLB,,, | Performed by: INTERNAL MEDICINE

## 2023-06-14 PROCEDURE — 78803 RP LOCLZJ TUM SPECT 1 AREA: CPT

## 2023-06-14 PROCEDURE — 93005 ELECTROCARDIOGRAM TRACING: CPT | Mod: S$GLB,,, | Performed by: INTERNAL MEDICINE

## 2023-06-14 PROCEDURE — 93010 EKG 12-LEAD: ICD-10-PCS | Mod: S$GLB,,, | Performed by: INTERNAL MEDICINE

## 2023-06-14 PROCEDURE — 78803 RP LOCLZJ TUM SPECT 1 AREA: CPT | Mod: 26,,, | Performed by: INTERNAL MEDICINE

## 2023-06-14 PROCEDURE — 93005 EKG 12-LEAD: ICD-10-PCS | Mod: S$GLB,,, | Performed by: INTERNAL MEDICINE

## 2023-06-14 PROCEDURE — 78803 CV PYP ATTR W SPECT (CUPID ONLY): ICD-10-PCS | Mod: 26,,, | Performed by: INTERNAL MEDICINE

## 2023-06-15 ENCOUNTER — HOSPITAL ENCOUNTER (OUTPATIENT)
Dept: CARDIOLOGY | Facility: HOSPITAL | Age: 53
Discharge: HOME | End: 2023-06-15
Attending: INTERNAL MEDICINE
Payer: COMMERCIAL

## 2023-06-15 VITALS
BODY MASS INDEX: 39.01 KG/M2 | HEIGHT: 62 IN | DIASTOLIC BLOOD PRESSURE: 85 MMHG | WEIGHT: 212 LBS | SYSTOLIC BLOOD PRESSURE: 140 MMHG | HEART RATE: 72 BPM

## 2023-06-15 DIAGNOSIS — G63: ICD-10-CM

## 2023-06-15 DIAGNOSIS — Z83.49 FAMILY HISTORY OF AMYLOIDOSIS: ICD-10-CM

## 2023-06-15 DIAGNOSIS — E85.1: ICD-10-CM

## 2023-06-15 LAB
ASCENDING AORTA: 2.67 CM
AV INDEX (PROSTH): 0.9
AV MEAN GRADIENT: 5 MMHG
AV PEAK GRADIENT: 9 MMHG
AV VALVE AREA: 2.59 CM2
AV VELOCITY RATIO: 0.97
BSA FOR ECHO PROCEDURE: 2.05 M2
CV ECHO LV RWT: 0.36 CM
DOP CALC AO PEAK VEL: 1.53 M/S
DOP CALC AO VTI: 34.25 CM
DOP CALC LVOT AREA: 2.9 CM2
DOP CALC LVOT DIAMETER: 1.91 CM
DOP CALC LVOT PEAK VEL: 1.49 M/S
DOP CALC LVOT STROKE VOLUME: 88.75 CM3
DOP CALC MV VTI: 22.52 CM
DOP CALCLVOT PEAK VEL VTI: 30.99 CM
E WAVE DECELERATION TIME: 238.15 MSEC
E/A RATIO: 1
E/E' RATIO: 6 M/S
ECHO LV POSTERIOR WALL: 0.77 CM (ref 0.6–1.1)
EJECTION FRACTION: 65 %
FRACTIONAL SHORTENING: 36 % (ref 28–44)
INTERVENTRICULAR SEPTUM: 0.76 CM (ref 0.6–1.1)
LA MAJOR: 4.25 CM
LA MINOR: 4.25 CM
LA WIDTH: 3.11 CM
LEFT ATRIUM SIZE: 3.33 CM
LEFT ATRIUM VOLUME INDEX MOD: 14.2 ML/M2
LEFT ATRIUM VOLUME INDEX: 19.1 ML/M2
LEFT ATRIUM VOLUME MOD: 27.82 CM3
LEFT ATRIUM VOLUME: 37.41 CM3
LEFT INTERNAL DIMENSION IN SYSTOLE: 2.74 CM (ref 2.1–4)
LEFT VENTRICLE DIASTOLIC VOLUME INDEX: 41.32 ML/M2
LEFT VENTRICLE DIASTOLIC VOLUME: 80.99 ML
LEFT VENTRICLE MASS INDEX: 50 G/M2
LEFT VENTRICLE SYSTOLIC VOLUME INDEX: 14.3 ML/M2
LEFT VENTRICLE SYSTOLIC VOLUME: 28.01 ML
LEFT VENTRICULAR INTERNAL DIMENSION IN DIASTOLE: 4.25 CM (ref 3.5–6)
LEFT VENTRICULAR MASS: 97.39 G
LV LATERAL E/E' RATIO: 5.73 M/S
LV SEPTAL E/E' RATIO: 6.3 M/S
MV A" WAVE DURATION": 11.7 MSEC
MV MEAN GRADIENT: 1 MMHG
MV PEAK A VEL: 0.63 M/S
MV PEAK E VEL: 0.63 M/S
MV PEAK GRADIENT: 2 MMHG
MV STENOSIS PRESSURE HALF TIME: 69.06 MS
MV VALVE AREA BY CONTINUITY EQUATION: 3.94 CM2
MV VALVE AREA P 1/2 METHOD: 3.19 CM2
PISA TR MAX VEL: 2.34 M/S
PULM VEIN S/D RATIO: 1.22
PV PEAK D VEL: 0.36 M/S
PV PEAK S VEL: 0.44 M/S
RA MAJOR: 3.84 CM
RA PRESSURE: 3 MMHG
RA WIDTH: 2.8 CM
RIGHT VENTRICULAR END-DIASTOLIC DIMENSION: 2.42 CM
SINUS: 2.5 CM
STJ: 2.37 CM
TDI LATERAL: 0.11 M/S
TDI SEPTAL: 0.1 M/S
TDI: 0.11 M/S
TR MAX PG: 22 MMHG
TRICUSPID ANNULAR PLANE SYSTOLIC EXCURSION: 1.95 CM
TV REST PULMONARY ARTERY PRESSURE: 25 MMHG

## 2023-06-15 PROCEDURE — 93306 ECHO (CUPID ONLY): ICD-10-PCS | Mod: 26,,, | Performed by: INTERNAL MEDICINE

## 2023-06-15 PROCEDURE — 93306 TTE W/DOPPLER COMPLETE: CPT | Mod: 26,,, | Performed by: INTERNAL MEDICINE

## 2023-06-15 PROCEDURE — 93356 MYOCRD STRAIN IMG SPCKL TRCK: CPT | Mod: ,,, | Performed by: INTERNAL MEDICINE

## 2023-06-15 PROCEDURE — 93356 MYOCRD STRAIN IMG SPCKL TRCK: CPT

## 2023-06-15 PROCEDURE — 93356 ECHO (CUPID ONLY): ICD-10-PCS | Mod: ,,, | Performed by: INTERNAL MEDICINE

## 2023-06-23 ENCOUNTER — TELEPHONE (OUTPATIENT)
Dept: TRANSPLANT | Facility: CLINIC | Age: 53
End: 2023-06-23
Payer: COMMERCIAL

## 2023-07-05 ENCOUNTER — TELEPHONE (OUTPATIENT)
Dept: TRANSPLANT | Facility: CLINIC | Age: 53
End: 2023-07-05
Payer: COMMERCIAL

## 2023-07-05 NOTE — TELEPHONE ENCOUNTER
Spoke with Ms. Armas regarding the results of PYP, echo & PYP, all negative for amyloidosis per Dr. Smith. She stated that she saw neurologist in May and EMG was negative for amyloidosis as well. Instructed to follow up every 2-3 years with tests above unless symptomatic. Verbalized understanding.

## 2023-07-06 ENCOUNTER — CLINICAL SUPPORT (OUTPATIENT)
Dept: SMOKING CESSATION | Facility: CLINIC | Age: 53
End: 2023-07-06

## 2023-07-06 DIAGNOSIS — F17.200 NICOTINE DEPENDENCE: Primary | ICD-10-CM

## 2023-07-06 PROCEDURE — 99407 BEHAV CHNG SMOKING > 10 MIN: CPT | Mod: S$GLB,,,

## 2023-07-06 PROCEDURE — 99999 PR PBB SHADOW E&M-EST. PATIENT-LVL I: CPT | Mod: PBBFAC,,,

## 2023-07-06 PROCEDURE — 99407 PR TOBACCO USE CESSATION INTENSIVE >10 MINUTES: ICD-10-PCS | Mod: S$GLB,,,

## 2023-07-06 PROCEDURE — 99999 PR PBB SHADOW E&M-EST. PATIENT-LVL I: ICD-10-PCS | Mod: PBBFAC,,,

## 2023-07-06 NOTE — PROGRESS NOTES
Spoke with patient today in regard to smoking cessation progress for 6-month telephone follow up.  Patient states that she is tobacco free and quit in February of 2023.  Commended patient on their quit.  Patient states she benefited from the use of varenicline.  Informed patient of benefit period, future follow up, and contact information if any further help or support is needed. Will complete smart form for 6 month follow up on Quit attempt #1.

## 2023-11-22 DIAGNOSIS — F41.9 ANXIETY AND DEPRESSION: ICD-10-CM

## 2023-11-22 DIAGNOSIS — F43.0 ACUTE STRESS DISORDER: ICD-10-CM

## 2023-11-22 DIAGNOSIS — F32.A ANXIETY AND DEPRESSION: ICD-10-CM

## 2023-11-22 NOTE — TELEPHONE ENCOUNTER
----- Message from Iftikhar Munguia sent at 11/22/2023 11:44 AM CST -----  Contact: Madeleine  Patient is requesting a call back concerning blood work and upcoming appointment, please call back at [phone]           Thanks

## 2023-11-22 NOTE — TELEPHONE ENCOUNTER
Patient hasn't has any xanax for 2 months. Her dad has passes away and her anxiety has been really bad. She is having panic attacks and while having to work and take care of everything from her father's passing and helping with her mother.   Please advise.

## 2023-11-22 NOTE — TELEPHONE ENCOUNTER
----- Message from Fili Samano sent at 11/22/2023 12:19 PM CST -----  Contact: Madeleine  Type:  Patient Returning Call    Who Called:Madeleine   Who Left Message for Patient:Franny  Does the patient know what this is regarding?: return call   Would the patient rather a call back or a response via [facility]? Call back   Best Call Back Number:[phone] or [phone]  Additional Information:     Thanks   Am

## 2023-11-27 ENCOUNTER — LAB VISIT (OUTPATIENT)
Dept: LAB | Facility: HOSPITAL | Age: 53
End: 2023-11-27
Attending: FAMILY MEDICINE
Payer: COMMERCIAL

## 2023-11-27 DIAGNOSIS — E06.3 HASHIMOTO'S THYROIDITIS: ICD-10-CM

## 2023-11-27 DIAGNOSIS — E78.5 HYPERLIPIDEMIA, UNSPECIFIED HYPERLIPIDEMIA TYPE: ICD-10-CM

## 2023-11-27 LAB
ALBUMIN SERPL BCP-MCNC: 3.6 G/DL (ref 3.5–5.2)
ALP SERPL-CCNC: 68 U/L (ref 55–135)
ALT SERPL W/O P-5'-P-CCNC: 24 U/L (ref 10–44)
ANION GAP SERPL CALC-SCNC: 11 MMOL/L (ref 8–16)
AST SERPL-CCNC: 28 U/L (ref 10–40)
BASOPHILS # BLD AUTO: 0.06 K/UL (ref 0–0.2)
BASOPHILS NFR BLD: 0.6 % (ref 0–1.9)
BILIRUB SERPL-MCNC: 0.7 MG/DL (ref 0.1–1)
BUN SERPL-MCNC: 8 MG/DL (ref 6–20)
CALCIUM SERPL-MCNC: 9.3 MG/DL (ref 8.7–10.5)
CHLORIDE SERPL-SCNC: 102 MMOL/L (ref 95–110)
CHOLEST SERPL-MCNC: 194 MG/DL (ref 120–199)
CHOLEST/HDLC SERPL: 2.9 {RATIO} (ref 2–5)
CO2 SERPL-SCNC: 26 MMOL/L (ref 23–29)
CREAT SERPL-MCNC: 0.9 MG/DL (ref 0.5–1.4)
DIFFERENTIAL METHOD: ABNORMAL
EOSINOPHIL # BLD AUTO: 0.1 K/UL (ref 0–0.5)
EOSINOPHIL NFR BLD: 0.9 % (ref 0–8)
ERYTHROCYTE [DISTWIDTH] IN BLOOD BY AUTOMATED COUNT: 15.4 % (ref 11.5–14.5)
EST. GFR  (NO RACE VARIABLE): >60 ML/MIN/1.73 M^2
ESTIMATED AVG GLUCOSE: 97 MG/DL (ref 68–131)
GLUCOSE SERPL-MCNC: 77 MG/DL (ref 70–110)
HBA1C MFR BLD: 5 % (ref 4–5.6)
HCT VFR BLD AUTO: 40.5 % (ref 37–48.5)
HDLC SERPL-MCNC: 66 MG/DL (ref 40–75)
HDLC SERPL: 34 % (ref 20–50)
HGB BLD-MCNC: 13.2 G/DL (ref 12–16)
IMM GRANULOCYTES # BLD AUTO: 0.03 K/UL (ref 0–0.04)
IMM GRANULOCYTES NFR BLD AUTO: 0.3 % (ref 0–0.5)
LDLC SERPL CALC-MCNC: 115.6 MG/DL (ref 63–159)
LYMPHOCYTES # BLD AUTO: 3.4 K/UL (ref 1–4.8)
LYMPHOCYTES NFR BLD: 35.9 % (ref 18–48)
MCH RBC QN AUTO: 30 PG (ref 27–31)
MCHC RBC AUTO-ENTMCNC: 32.6 G/DL (ref 32–36)
MCV RBC AUTO: 92 FL (ref 82–98)
MONOCYTES # BLD AUTO: 0.5 K/UL (ref 0.3–1)
MONOCYTES NFR BLD: 4.8 % (ref 4–15)
NEUTROPHILS # BLD AUTO: 5.5 K/UL (ref 1.8–7.7)
NEUTROPHILS NFR BLD: 57.5 % (ref 38–73)
NONHDLC SERPL-MCNC: 128 MG/DL
NRBC BLD-RTO: 0 /100 WBC
PLATELET # BLD AUTO: 316 K/UL (ref 150–450)
PMV BLD AUTO: 10.8 FL (ref 9.2–12.9)
POTASSIUM SERPL-SCNC: 4.2 MMOL/L (ref 3.5–5.1)
PROT SERPL-MCNC: 6.9 G/DL (ref 6–8.4)
RBC # BLD AUTO: 4.4 M/UL (ref 4–5.4)
SODIUM SERPL-SCNC: 139 MMOL/L (ref 136–145)
T3FREE SERPL-MCNC: 2.7 PG/ML (ref 2.3–4.2)
T4 FREE SERPL-MCNC: 0.76 NG/DL (ref 0.71–1.51)
THYROPEROXIDASE IGG SERPL-ACNC: <6 IU/ML
TRIGL SERPL-MCNC: 62 MG/DL (ref 30–150)
TSH SERPL DL<=0.005 MIU/L-ACNC: 0.92 UIU/ML (ref 0.4–4)
WBC # BLD AUTO: 9.5 K/UL (ref 3.9–12.7)

## 2023-11-27 PROCEDURE — 84481 FREE ASSAY (FT-3): CPT | Performed by: FAMILY MEDICINE

## 2023-11-27 PROCEDURE — 80053 COMPREHEN METABOLIC PANEL: CPT | Performed by: FAMILY MEDICINE

## 2023-11-27 PROCEDURE — 86376 MICROSOMAL ANTIBODY EACH: CPT | Performed by: FAMILY MEDICINE

## 2023-11-27 PROCEDURE — 85025 COMPLETE CBC W/AUTO DIFF WBC: CPT | Performed by: FAMILY MEDICINE

## 2023-11-27 PROCEDURE — 83036 HEMOGLOBIN GLYCOSYLATED A1C: CPT | Performed by: FAMILY MEDICINE

## 2023-11-27 PROCEDURE — 80061 LIPID PANEL: CPT | Performed by: FAMILY MEDICINE

## 2023-11-27 PROCEDURE — 84443 ASSAY THYROID STIM HORMONE: CPT | Performed by: FAMILY MEDICINE

## 2023-11-27 PROCEDURE — 36415 COLL VENOUS BLD VENIPUNCTURE: CPT | Mod: PO | Performed by: FAMILY MEDICINE

## 2023-11-27 PROCEDURE — 84439 ASSAY OF FREE THYROXINE: CPT | Performed by: FAMILY MEDICINE

## 2023-11-27 RX ORDER — ALPRAZOLAM 0.5 MG/1
0.5 TABLET ORAL DAILY
Qty: 30 TABLET | Refills: 5 | Status: SHIPPED | OUTPATIENT
Start: 2023-11-27

## 2023-12-08 ENCOUNTER — PATIENT MESSAGE (OUTPATIENT)
Dept: PSYCHIATRY | Facility: CLINIC | Age: 53
End: 2023-12-08
Payer: COMMERCIAL

## 2023-12-08 ENCOUNTER — OFFICE VISIT (OUTPATIENT)
Dept: PRIMARY CARE CLINIC | Facility: CLINIC | Age: 53
End: 2023-12-08
Payer: COMMERCIAL

## 2023-12-08 VITALS
DIASTOLIC BLOOD PRESSURE: 86 MMHG | SYSTOLIC BLOOD PRESSURE: 122 MMHG | WEIGHT: 210.56 LBS | OXYGEN SATURATION: 99 % | BODY MASS INDEX: 38.75 KG/M2 | HEART RATE: 88 BPM | HEIGHT: 62 IN | TEMPERATURE: 98 F

## 2023-12-08 DIAGNOSIS — E11.65 TYPE 2 DIABETES MELLITUS WITH HYPERGLYCEMIA, WITHOUT LONG-TERM CURRENT USE OF INSULIN: ICD-10-CM

## 2023-12-08 DIAGNOSIS — I10 HYPERTENSION, UNSPECIFIED TYPE: ICD-10-CM

## 2023-12-08 DIAGNOSIS — G47.9 SLEEP DISORDER: ICD-10-CM

## 2023-12-08 DIAGNOSIS — M79.7 FIBROMYALGIA: ICD-10-CM

## 2023-12-08 DIAGNOSIS — F32.A ANXIETY AND DEPRESSION: ICD-10-CM

## 2023-12-08 DIAGNOSIS — E66.01 MORBID OBESITY DUE TO EXCESS CALORIES: ICD-10-CM

## 2023-12-08 DIAGNOSIS — E03.9 HYPOTHYROIDISM, UNSPECIFIED TYPE: ICD-10-CM

## 2023-12-08 DIAGNOSIS — E85.2 FAMILIAL AMYLOIDOSIS: Primary | ICD-10-CM

## 2023-12-08 DIAGNOSIS — K29.70 GASTRITIS, PRESENCE OF BLEEDING UNSPECIFIED, UNSPECIFIED CHRONICITY, UNSPECIFIED GASTRITIS TYPE: ICD-10-CM

## 2023-12-08 DIAGNOSIS — F41.9 ANXIETY AND DEPRESSION: ICD-10-CM

## 2023-12-08 PROBLEM — N18.2 STAGE 2 CHRONIC KIDNEY DISEASE: Status: RESOLVED | Noted: 2019-01-03 | Resolved: 2023-12-08

## 2023-12-08 PROBLEM — R11.2 NAUSEA AND VOMITING: Status: RESOLVED | Noted: 2023-02-07 | Resolved: 2023-12-08

## 2023-12-08 PROBLEM — G56.03 CARPAL TUNNEL SYNDROME, BILATERAL: Status: RESOLVED | Noted: 2023-02-15 | Resolved: 2023-12-08

## 2023-12-08 PROCEDURE — 99215 PR OFFICE/OUTPT VISIT, EST, LEVL V, 40-54 MIN: ICD-10-PCS | Mod: S$GLB,,, | Performed by: FAMILY MEDICINE

## 2023-12-08 PROCEDURE — 99215 OFFICE O/P EST HI 40 MIN: CPT | Mod: S$GLB,,, | Performed by: FAMILY MEDICINE

## 2023-12-08 PROCEDURE — 99999 PR PBB SHADOW E&M-EST. PATIENT-LVL IV: CPT | Mod: PBBFAC,,, | Performed by: FAMILY MEDICINE

## 2023-12-08 PROCEDURE — 99999 PR PBB SHADOW E&M-EST. PATIENT-LVL IV: ICD-10-PCS | Mod: PBBFAC,,, | Performed by: FAMILY MEDICINE

## 2023-12-08 RX ORDER — CELECOXIB 100 MG/1
100 CAPSULE ORAL DAILY
Qty: 90 CAPSULE | Refills: 1 | Status: SHIPPED | OUTPATIENT
Start: 2023-12-08

## 2023-12-08 RX ORDER — ESZOPICLONE 2 MG/1
2 TABLET, FILM COATED ORAL NIGHTLY
Qty: 90 TABLET | Refills: 1 | Status: SHIPPED | OUTPATIENT
Start: 2023-12-08

## 2023-12-08 RX ORDER — LEVOTHYROXINE SODIUM 75 UG/1
75 TABLET ORAL
Qty: 90 TABLET | Refills: 1 | Status: SHIPPED | OUTPATIENT
Start: 2023-12-08

## 2023-12-08 RX ORDER — BREXPIPRAZOLE 0.5 MG/1
0.5 TABLET ORAL NIGHTLY
Qty: 30 TABLET | Refills: 5 | Status: SHIPPED | OUTPATIENT
Start: 2023-12-08 | End: 2024-02-04

## 2023-12-08 RX ORDER — SEMAGLUTIDE 2.68 MG/ML
2 INJECTION, SOLUTION SUBCUTANEOUS
Qty: 9 ML | Refills: 2 | Status: SHIPPED | OUTPATIENT
Start: 2023-12-08

## 2023-12-08 RX ORDER — PANTOPRAZOLE SODIUM 40 MG/1
40 TABLET, DELAYED RELEASE ORAL DAILY
Qty: 90 TABLET | Refills: 1 | Status: SHIPPED | OUTPATIENT
Start: 2023-12-08

## 2023-12-08 RX ORDER — VENLAFAXINE HYDROCHLORIDE 150 MG/1
150 CAPSULE, EXTENDED RELEASE ORAL DAILY
Qty: 90 CAPSULE | Refills: 1 | Status: SHIPPED | OUTPATIENT
Start: 2023-12-08

## 2023-12-08 RX ORDER — LIOTHYRONINE SODIUM 5 UG/1
5 TABLET ORAL 3 TIMES DAILY
Qty: 270 TABLET | Refills: 1 | Status: SHIPPED | OUTPATIENT
Start: 2023-12-08

## 2023-12-08 RX ORDER — HYDROCHLOROTHIAZIDE 12.5 MG/1
12.5 CAPSULE ORAL DAILY
Qty: 90 CAPSULE | Refills: 1 | Status: SHIPPED | OUTPATIENT
Start: 2023-12-08

## 2023-12-08 NOTE — PROGRESS NOTES
"    [facility]       [address] [place] [address]      [phone]     Allegra Cruz MD         .      Office Visit  12/08/2023  [id number]      SUBJECTIVE     HPI:  Madeleine Sheikh is a 53 y.o. female presents today in clinic for "Follow-up  ."   Elke is here for routine follow up but is struggling with with overall life- she is declining in work productivity, she is not sleeping well, having more panic attacks.  She was doing well on effexor but no longer helping.  She did lose her stepdad recently and she is still caring for her elderly mother who lives in .  She feels overwhelmed.  She was going to therapy but she hasn't seen psychologist.  She states she isnt sleeping.  She does take xanax. She tried meditation and yoga but nothing is helping.  .  She is now asked to take communication classes at work as recommended by her employer since she struggles to appropriately communicate with colleagues, something she has never had an issue with before.  she fears she may lose her job if she doesn't get better.  She often feels like it  is becoming  harder to balance on what to do at work and how to multi task and how to complete duties.  She denies any suicidal thoughts or ideations, but strongly thinks she may need intensive therapy.  She is requesting time off work starting today until she can work on self and get better. She did take some personal leave from work earlier this year, but no true resolution was obtained and she feels like she needs to take time away from work again.  She did go to therapy and she completed all of her therapy sessions which she thinks helped, but now she finds herself back in the same position.  She did have labs drawn, and would like to go over these as well.    Follow-up        ROS   Review of symptoms are negative except as noted.     PAST MEDICAL HISTORY     Past Medical History:   Diagnosis Date    " Carpal tunnel syndrome, bilateral     CKD (chronic kidney disease) stage 3, GFR 30-59 ml/min     Diabetes mellitus     Fibromyalgia     Hereditary amyloidosis 2023    Hypertension     Migraine     GARRY on CPAP     Vertigo        Past Surgical History:   Procedure Laterality Date    APPENDECTOMY  1975     SECTION      CHOLECYSTECTOMY  1997    denture implants N/A     DILATION AND CURETTAGE OF UTERUS      GASTRIC BYPASS  2004    HYSTEROSCOPY WITH DILATION AND CURETTAGE OF UTERUS      KNEE ARTHROSCOPY Left 2009       Social History     Socioeconomic History    Marital status:    Tobacco Use    Smoking status: Former     Current packs/day: 0.00     Average packs/day: 0.8 packs/day for 30.0 years (22.5 ttl pk-yrs)     Types: Cigarettes     Start date: 1993     Quit date: 2023     Years since quittin.8    Smokeless tobacco: Never   Substance and Sexual Activity    Alcohol use: No    Drug use: Never    Sexual activity: Yes     Comment: Occasional       Allergies as of 2023 - Reviewed 2023   Allergen Reaction Noted    Amlodipine Swelling 10/10/2019    Augmentin [amoxicillin-pot clavulanate] Anaphylaxis and Other (See Comments) 2018    Azithromycin Swelling and Nausea And Vomiting 2012    Doxycycline Shortness Of Breath and Nausea And Vomiting 2012    Morphine Hallucinations and Shortness Of Breath 1994    Sumatriptan Swelling 2012    Tioconazole Hives 2012    Bupropion hcl  2022    Canagliflozin Other (See Comments) 2018    Doxycycline monohydrate  2022    Morphine sulfate  2022       HOME MEDS     Current Outpatient Medications   Medication Instructions    ALPRAZolam (XANAX) 0.5 mg, Oral, Daily    azelastine (OPTIVAR) 0.05 % ophthalmic solution 1 drop, Ophthalmic, 2 times daily PRN    celecoxib (CELEBREX) 100 mg, Oral, Daily    cyanocobalamin, vitamin B-12, (VITAMIN B-12 INJ) Injection    EMGALITY  mg,  "Subcutaneous    eszopiclone (LUNESTA) 2 mg, Oral, Nightly    hydroCHLOROthiazide (MICROZIDE) 12.5 mg, Oral, Daily    levothyroxine (SYNTHROID) 75 mcg, Oral, Before breakfast    liothyronine (CYTOMEL) 5 mcg, Oral, 3 times daily    meclizine (ANTIVERT) 25 mg, Oral    montelukast (SINGULAIR) 10 mg, Oral    naltrexone capsule 4.5 mg, Oral, Nightly    ondansetron (ZOFRAN) 4 mg, Oral, Every 8 hours PRN    OZEMPIC 2 mg, Subcutaneous, Every 7 days    pantoprazole (PROTONIX) 40 mg, Oral, Daily    REXULTI 0.5 mg, Oral, Nightly    venlafaxine (EFFEXOR-XR) 150 mg, Oral, Daily       The following were updated and reviewed by myself in the chart: medications, past medical history, past surgical history, family history, social history, and allergies.        Objective    OBJECTIVE   /86   Pulse 88   Temp 97.6 °F (36.4 °C)   Ht 5' 2" (1.575 m)   Wt 95.5 kg (210 lb 8.6 oz)   LMP 09/10/2019   SpO2 99%   BMI 38.51 kg/m²     Wt Readings from Last 2 Encounters:   12/08/23 95.5 kg (210 lb 8.6 oz)   06/15/23 96.2 kg (212 lb)       BP Readings from Last 2 Encounters:   12/08/23 122/86   06/15/23 (!) 140/85          Physical Exam  Vitals and nursing note reviewed.   Constitutional:       Appearance: Normal appearance. She is obese.      Comments: Appears fatigue   HENT:      Head: Normocephalic and atraumatic.      Nose: Nose normal.   Eyes:      Extraocular Movements: Extraocular movements intact.      Conjunctiva/sclera: Conjunctivae normal.      Pupils: Pupils are equal, round, and reactive to light.   Cardiovascular:      Rate and Rhythm: Normal rate and regular rhythm.   Pulmonary:      Effort: Pulmonary effort is normal.      Breath sounds: Normal breath sounds.   Abdominal:      General: Abdomen is flat.      Palpations: Abdomen is soft.      Tenderness: There is no abdominal tenderness.      Comments: Increased abdominal girth   Musculoskeletal:         General: No swelling or tenderness. Normal range of motion.      " Cervical back: Normal range of motion.   Lymphadenopathy:      Cervical: No cervical adenopathy.   Skin:     General: Skin is warm.      Findings: No lesion or rash.   Neurological:      General: No focal deficit present.      Mental Status: She is alert. Mental status is at baseline.      Cranial Nerves: Cranial nerves 2-12 are intact.      Motor: Motor function is intact.   Psychiatric:         Attention and Perception: She is inattentive.         Mood and Affect: Mood is depressed. Affect is tearful.         Speech: Speech normal.         Behavior: Behavior normal.         Thought Content: Thought content normal.         Cognition and Memory: Cognition normal.         Judgment: Judgment normal.               LABS      LAB RESULTS, IF AVAILABLE, ARE DISCUSSED WITH PATIENT AND POSTED TO PATIENT PORTAL     ASSESSMENT & PLAN     1. Familial amyloidosis    2. Anxiety and depression  -     Ambulatory referral/consult to Psychiatry; Future; Expected date: 12/15/2023  -     venlafaxine (EFFEXOR-XR) 150 MG Cp24; Take 1 capsule (150 mg total) by mouth once daily.  Dispense: 90 capsule; Refill: 1  -     brexpiprazole (REXULTI) 0.5 mg Tab; Take 0.5 mg by mouth nightly.  Dispense: 30 tablet; Refill: 5    3. Type 2 diabetes mellitus with hyperglycemia, without long-term current use of insulin  -     semaglutide (OZEMPIC) 2 mg/dose (8 mg/3 mL) PnIj; Inject 2 mg into the skin every 7 days.  Dispense: 9 mL; Refill: 2    4. Fibromyalgia  -     Ambulatory referral/consult to Psychiatry; Future; Expected date: 12/15/2023  -     venlafaxine (EFFEXOR-XR) 150 MG Cp24; Take 1 capsule (150 mg total) by mouth once daily.  Dispense: 90 capsule; Refill: 1  -     celecoxib (CELEBREX) 100 MG capsule; Take 1 capsule (100 mg total) by mouth once daily.  Dispense: 90 capsule; Refill: 1    5. Hypertension, unspecified type  -     hydroCHLOROthiazide (MICROZIDE) 12.5 mg capsule; Take 1 capsule (12.5 mg total) by mouth once daily.  Dispense: 90  capsule; Refill: 1    6. Morbid obesity due to excess calories    7. Sleep disorder  -     eszopiclone (LUNESTA) 2 MG Tab; Take 1 tablet (2 mg total) by mouth nightly.  Dispense: 90 tablet; Refill: 1    8. Hypothyroidism, unspecified type  -     levothyroxine (SYNTHROID) 75 MCG tablet; Take 1 tablet (75 mcg total) by mouth before breakfast.  Dispense: 90 tablet; Refill: 1  -     liothyronine (CYTOMEL) 5 MCG Tab; Take 1 tablet (5 mcg total) by mouth 3 (three) times daily.  Dispense: 270 tablet; Refill: 1    9. Gastritis, presence of bleeding unspecified, unspecified chronicity, unspecified gastritis type  -     pantoprazole (PROTONIX) 40 MG tablet; Take 1 tablet (40 mg total) by mouth once daily.  Dispense: 90 tablet; Refill: 1      Labs overall look good so no change to her medication will be made.  I do question if stopping some of her medication when she ran out contributed to some of the problems she is having with sleep so we will restart the Lunesta, increase the Effexor to see if this would help with her anxiety depression and her fibromyalgia and then if this is not better within a week or two okay to add the results.  We will certainly follow-up with refer her to Psychiatry as she will likely need to get in more intense treatment and medication changes maybe made.  For now no other changes her medication until she can see Psychiatry.  Patient will get me the whatever paperwork she needs for FMLA and she will have the work with her psychiatrist to determine the optimal amount of time that she will have to be out until she is better and mentally fit to handle her day-to-day job duties.    I spent a total of 45 minutes face to face and non-face to face on the date of this visit.This includes time preparing to see the patient (eg, review of tests, notes), obtaining and/or reviewing additional history from an independent historian and/or outside medical records, documenting clinical information in the electronic  "health record, independently interpreting results and/or communicating results to the patient/family/caregiver, or care coordinator.      Disclaimer: Portions of this record may have been created with voice recognition software. Occasional wrong-word or "sound-a-like" substitutions may have occurred due to inherent limitations of voice recognition software. Read the chart carefully and recognize, using context, where substitutions have occurred."    Signed by:  Allegra Cruz MD           "

## 2023-12-11 ENCOUNTER — TELEPHONE (OUTPATIENT)
Dept: PRIMARY CARE CLINIC | Facility: CLINIC | Age: 53
End: 2023-12-11
Payer: COMMERCIAL

## 2023-12-11 NOTE — TELEPHONE ENCOUNTER
----- Message from Thalia Gaston sent at 12/11/2023 10:15 AM CST -----  Contact: [employer] was calling to speak to the dr regarding the pt depression/anxiety dx. Please call him back at [phone]. Claim#  [id number]    Thanks  TS

## 2023-12-11 NOTE — TELEPHONE ENCOUNTER
----- Message from Fili Samano sent at 12/11/2023  8:13 AM CST -----  Contact: Tracie/[employer]  Tracie would like a call back at [phone], ref#[id number], in regards to needing to get additional information for the patient's last visit, along with any clinical notes and medication prescribed if any.  Thanks  Am

## 2023-12-12 ENCOUNTER — PATIENT MESSAGE (OUTPATIENT)
Dept: PRIMARY CARE CLINIC | Facility: CLINIC | Age: 53
End: 2023-12-12
Payer: COMMERCIAL

## 2023-12-15 ENCOUNTER — PATIENT MESSAGE (OUTPATIENT)
Dept: PRIMARY CARE CLINIC | Facility: CLINIC | Age: 53
End: 2023-12-15
Payer: COMMERCIAL

## 2023-12-26 ENCOUNTER — PATIENT MESSAGE (OUTPATIENT)
Dept: PRIMARY CARE CLINIC | Facility: CLINIC | Age: 53
End: 2023-12-26
Payer: COMMERCIAL

## 2023-12-26 ENCOUNTER — TELEPHONE (OUTPATIENT)
Dept: PRIMARY CARE CLINIC | Facility: CLINIC | Age: 53
End: 2023-12-26
Payer: COMMERCIAL

## 2023-12-26 NOTE — TELEPHONE ENCOUNTER
----- Message from Maryse Stoll sent at 12/26/2023  9:16 AM CST -----  Regarding: Patient Returning call  Contact: Madeleine  .Type:  Patient Returning Call    Who Called: Madeleine  Who Left Message for Patient:  Does the patient know what this is regarding?: DEAN   Would the patient rather a call back or a response via [facility]? call  Best Call Back Number: [phone] (home)   Additional Information:  Madeleine is returning a missed call today and would like to speak to you.

## 2023-12-28 ENCOUNTER — PATIENT MESSAGE (OUTPATIENT)
Dept: PRIMARY CARE CLINIC | Facility: CLINIC | Age: 53
End: 2023-12-28
Payer: COMMERCIAL

## 2024-01-02 ENCOUNTER — TELEPHONE (OUTPATIENT)
Dept: PRIMARY CARE CLINIC | Facility: CLINIC | Age: 54
End: 2024-01-02
Payer: COMMERCIAL

## 2024-01-04 ENCOUNTER — TELEPHONE (OUTPATIENT)
Dept: SMOKING CESSATION | Facility: CLINIC | Age: 54
End: 2024-01-04
Payer: COMMERCIAL

## 2024-01-25 ENCOUNTER — TELEPHONE (OUTPATIENT)
Dept: SMOKING CESSATION | Facility: CLINIC | Age: 54
End: 2024-01-25
Payer: COMMERCIAL

## 2024-02-15 DIAGNOSIS — M17.0 ARTHRITIS OF BOTH KNEES: Primary | ICD-10-CM

## 2024-02-29 ENCOUNTER — TELEPHONE (OUTPATIENT)
Dept: PRIMARY CARE CLINIC | Facility: CLINIC | Age: 54
End: 2024-02-29
Payer: COMMERCIAL

## 2024-02-29 NOTE — TELEPHONE ENCOUNTER
----- Message from Addy Shetty sent at 2/29/2024 10:33 AM CST -----  Contact: Patient  Patient is calling to speak with the nurse needing a follow up appt in March. Please call patient at .[phone]. There is nothing available to schedule.

## 2024-03-08 ENCOUNTER — OFFICE VISIT (OUTPATIENT)
Dept: ORTHOPEDICS | Facility: CLINIC | Age: 54
End: 2024-03-08
Payer: COMMERCIAL

## 2024-03-08 ENCOUNTER — HOSPITAL ENCOUNTER (OUTPATIENT)
Dept: RADIOLOGY | Facility: HOSPITAL | Age: 54
Discharge: HOME | End: 2024-03-08
Attending: ORTHOPAEDIC SURGERY
Payer: COMMERCIAL

## 2024-03-08 VITALS — WEIGHT: 214.19 LBS | HEIGHT: 62 IN | BODY MASS INDEX: 39.41 KG/M2

## 2024-03-08 DIAGNOSIS — S80.11XA CONTUSION OF RIGHT LOWER EXTREMITY, INITIAL ENCOUNTER: ICD-10-CM

## 2024-03-08 DIAGNOSIS — M17.0 ARTHRITIS OF BOTH KNEES: Primary | ICD-10-CM

## 2024-03-08 DIAGNOSIS — M17.0 ARTHRITIS OF BOTH KNEES: ICD-10-CM

## 2024-03-08 PROCEDURE — 99999 PR PBB SHADOW E&M-EST. PATIENT-LVL III: CPT | Mod: PBBFAC,,, | Performed by: ORTHOPAEDIC SURGERY

## 2024-03-08 PROCEDURE — 73564 X-RAY EXAM KNEE 4 OR MORE: CPT | Mod: 26,50,, | Performed by: RADIOLOGY

## 2024-03-08 PROCEDURE — 20610 DRAIN/INJ JOINT/BURSA W/O US: CPT | Mod: 50,S$GLB,, | Performed by: ORTHOPAEDIC SURGERY

## 2024-03-08 PROCEDURE — 99213 OFFICE O/P EST LOW 20 MIN: CPT | Mod: 25,S$GLB,, | Performed by: ORTHOPAEDIC SURGERY

## 2024-03-08 PROCEDURE — 73564 X-RAY EXAM KNEE 4 OR MORE: CPT | Mod: TC,50

## 2024-03-08 RX ORDER — METHOCARBAMOL 750 MG/1
750 TABLET, FILM COATED ORAL 3 TIMES DAILY PRN
Qty: 60 TABLET | Refills: 0 | Status: SHIPPED | OUTPATIENT
Start: 2024-03-08

## 2024-03-08 RX ORDER — TRIAMCINOLONE ACETONIDE 40 MG/ML
40 INJECTION, SUSPENSION INTRA-ARTICULAR; INTRAMUSCULAR
Status: DISCONTINUED | OUTPATIENT
Start: 2024-03-08 | End: 2024-03-08 | Stop reason: HOSPADM

## 2024-03-08 RX ADMIN — TRIAMCINOLONE ACETONIDE 40 MG: 40 INJECTION, SUSPENSION INTRA-ARTICULAR; INTRAMUSCULAR at 11:03

## 2024-03-08 NOTE — PROCEDURES
Large Joint Aspiration/Injection: bilateral knee    Date/Time: 3/8/2024 11:00 AM    Performed by: Rebel Love III, MD  Authorized by: Rebel Love III, MD    Consent Done?:  Yes (Verbal)  Indications:  Pain  Timeout: prior to procedure the correct patient, procedure, and site was verified    Prep: patient was prepped and draped in usual sterile fashion      Local anesthesia used?: Yes    Local anesthetic:  Lidocaine 1% without epinephrine  Anesthetic total (ml):  8 (4mL each knee)      Details:  Needle Size:  21 G  Location:  Knee  Laterality:  Bilateral  Site:  Bilateral knee  Medications (Right):  40 mg triamcinolone acetonide 40 mg/mL  Medications (Left):  40 mg triamcinolone acetonide 40 mg/mL  Patient tolerance:  Patient tolerated the procedure well with no immediate complications

## 2024-03-08 NOTE — PROGRESS NOTES
Subjective:     HPI:   Madeleine Sheikh is a 53 y.o. female [facility]    History of Present Illness  The patient presents for evaluation of right knee pain.    She has been hobbling for 2 weeks. The pain starts from the knee and feels like a stabbing pain and feels like somebody is stabbing her and tasing her electricity at the same time. She has been seen several times since 2021 for bilateral knee arthritis. We have done rounds of steroid injections and those have been very helpful. The last round we did was in 05/2023. At baseline, the knee arthritis has been okay.     About 2 weeks ago, she tripped while cleaning her garbage can and fell onto the anterior proximal third shin. She was able to get up and walk, but it has been sore since then, painful, and she has some electrical tingling sensations. Her left knee is giving out because the right knee is not doing its work. She has tried Tylenol, but it did not help. She can not take ibuprofen or NSAIDs because they trigger migraines. She has not tried Voltaren. She has been putting a heating pad on it at night when she goes to sleep, taking hot soaps in the tub, and ice pack. She does not have a cane or walker.       Objective:   Body mass index is 39.17 kg/m².  Exam:    Physical Exam  Severe limp and antalgic gait on the right knee. Left knee painless range of motion, no issues today. On the right knee, 5 to 110 degree knee range of motion, 7 degrees valgus alignment which corrects a couple of degrees. No groin pain with active straight leg raise. No pain with active or passive range of motion of her hip joint. Knee is stable to anterior, posterior, varus and valgus stresses with slight flexion contracture, but no extensor lag. Mild effusion in the knee. Tender to palpation at the lateral joint line. Nontender medial and patellofemoral joint line. She has resolving contusion at the anterolateral compartment at the proximal third  of her lower leg where it is diffusely sore. I do not feel any palpable masses. She has some mild discomfort with ankle dorsiflexion, but 5 out of 5 strength. She is neurovascularly intact distally.        Imaging:    Results  Imaging  Right knee x-rays were reviewed with the patient.    KNEE R ARTHRITIS    Indication:  Right knee pain  Exam Ordered: Radiographs of the right knee include a standing anteroposterior view, a standing posterioanterior view, a lateral view in full flexion, and a sunrise view  Details of Examination: Exam shows evidence of joint space narrowing, osteophyte formation, and subchondral sclerosis, all consistent with degenerative arthritis of the knee.  No other significant findings are noted.  Impression:  Degenerative Arthritis, Right Knee and KNEE L ARTHRITIS     Indication:  Left knee pain  Exam Ordered: Radiographs of the left knee include a standing anteroposterior view, a standing posterioanterior view, a lateral view in full flexion, and a sunrise view  Details of Examination: Exam shows evidence of joint space narrowing, osteophyte formation, and subchondral sclerosis, all consistent with degenerative arthritis of the knee.  No other significant findings are noted.  Impression:  Degenerative Arthritis, Left Knee    Progression of OA  Neg acute      Assessment/Plan:       ICD-10-CM ICD-9-CM   1. Arthritis of both knees  M17.0 716.96   2. Contusion of right lower extremity, initial encounter  S80.11XA 924.5        Nsaids - migraines    Assessment/Plan:     Assessment & Plan  1. Right knee valgus arthritis.  She has pre-existing valgus arthritis of the right knee. She certainly has flared up the way she is walking. She has a deep bruise in the muscle compartment. She can take Tylenol, ice, and heat. She can do some massage of the area to work that muscle out, working on pulling her ankle up and down, and stretching it as it gets real tight. I will give her a topical cream. She can use a  cane or walker. If it is not getting better over the next few weeks, we can get an MRI.     Tylenol    Aleve    Voltaren Gel    [facility] non-op arthritis info    Bursitis info    Total Joint Info    HEP: [facility] home exercise conditioning program     PT   X B knee CSI: intra-articular steroid injection    CSI: greater trochanter bursitis    HA: hyaluronic acid injection    Brace:     Referral:      Rx compound cream for contusion  Rx robaxin  Use moms cane or walker    F/u PRN  If ctxn persists, consider PT then MRI soft tissue eval    No orders of the defined types were placed in this encounter.      This note was generated with the assistance of ambient listening technology. Verbal consent was obtained by the patient and accompanying visitor(s) for the recording of patient appointment to facilitate this note. I attest to having reviewed and edited the generated note for accuracy, though some syntax or spelling errors may persist. Please contact the author of this note for any clarification.            Past Medical History:   Diagnosis Date    Carpal tunnel syndrome, bilateral     CKD (chronic kidney disease) stage 3, GFR 30-59 ml/min     Diabetes mellitus     Fibromyalgia     Hereditary amyloidosis 2023    Hypertension     Migraine     GARRY on CPAP     Vertigo        Past Surgical History:   Procedure Laterality Date    APPENDECTOMY       SECTION      CHOLECYSTECTOMY      denture implants N/A     DILATION AND CURETTAGE OF UTERUS      GASTRIC BYPASS  2004    HYSTEROSCOPY WITH DILATION AND CURETTAGE OF UTERUS      KNEE ARTHROSCOPY Left 2009       Family History   Problem Relation Age of Onset    Diabetes Mother     Heart disease Mother         Congestive heart failure due to hereditary TTR amyloidosis    Atrial fibrillation Mother     Heart failure Mother     Lung disease Mother     Glaucoma Mother     Glaucoma Father     Heart failure Father     Hyperlipidemia Father     Hypertension  Father     Heart disease Father     Coronary artery disease Father     Lung cancer Father     Colon cancer Father     Breast cancer Paternal Aunt        Social History     Socioeconomic History    Marital status:    Tobacco Use    Smoking status: Former     Current packs/day: 0.00     Average packs/day: 0.8 packs/day for 30.0 years (22.5 ttl pk-yrs)     Types: Cigarettes     Start date: 1993     Quit date: 2023     Years since quittin.0    Smokeless tobacco: Never   Substance and Sexual Activity    Alcohol use: No    Drug use: Never    Sexual activity: Yes     Comment: Occasional

## 2024-03-08 NOTE — PROGRESS NOTES
Injection Information     Triamcinolone Acetonide Injectable Suspension (40mg/mL)  Lot Number: [id number]  Expiration Date: 6/30/2025    The injection information was the same for both injections.

## 2024-03-15 ENCOUNTER — PATIENT MESSAGE (OUTPATIENT)
Dept: ORTHOPEDICS | Facility: CLINIC | Age: 54
End: 2024-03-15
Payer: COMMERCIAL

## 2024-05-21 DIAGNOSIS — M79.7 FIBROMYALGIA: ICD-10-CM

## 2024-05-21 DIAGNOSIS — K29.70 GASTRITIS, PRESENCE OF BLEEDING UNSPECIFIED, UNSPECIFIED CHRONICITY, UNSPECIFIED GASTRITIS TYPE: ICD-10-CM

## 2024-05-21 DIAGNOSIS — F32.A ANXIETY AND DEPRESSION: ICD-10-CM

## 2024-05-21 DIAGNOSIS — E03.9 HYPOTHYROIDISM, UNSPECIFIED TYPE: ICD-10-CM

## 2024-05-21 DIAGNOSIS — F41.9 ANXIETY AND DEPRESSION: ICD-10-CM

## 2024-05-22 ENCOUNTER — OFFICE VISIT (OUTPATIENT)
Dept: URGENT CARE | Facility: CLINIC | Age: 54
End: 2024-05-22
Payer: COMMERCIAL

## 2024-05-22 VITALS
HEART RATE: 106 BPM | SYSTOLIC BLOOD PRESSURE: 119 MMHG | HEIGHT: 62 IN | WEIGHT: 220.63 LBS | DIASTOLIC BLOOD PRESSURE: 83 MMHG | TEMPERATURE: 98 F | OXYGEN SATURATION: 97 % | BODY MASS INDEX: 40.6 KG/M2

## 2024-05-22 DIAGNOSIS — H00.015 HORDEOLUM EXTERNUM OF LEFT LOWER EYELID: Primary | ICD-10-CM

## 2024-05-22 PROCEDURE — 99213 OFFICE O/P EST LOW 20 MIN: CPT | Mod: S$GLB,,,

## 2024-05-22 RX ORDER — ARIPIPRAZOLE 5 MG/1
5 TABLET ORAL
COMMUNITY
Start: 2024-03-31

## 2024-05-22 NOTE — PROGRESS NOTES
"Subjective:      Patient ID: Madeleine Sheikh is a 53 y.o. female.    Vitals:  height is 5' 2" (1.575 m) and weight is 100.1 kg (220 lb 9.6 oz). Her tympanic temperature is 97.7 °F (36.5 °C). Her blood pressure is 119/83 and her pulse is 106. Her oxygen saturation is 97%.     Chief Complaint: Facial Swelling    52 y/o female here for left eye swelling lower eyelid and under eye, it is painful x 4 days. Pt has used Azelastine eye drops and her dog's Cipro eye drops but noticed no improvement. States she does wear contacts but not every day. States she noticed that the redness in her eye had improved but bump still present. Denies vision changes, eye trauma, eye drainage, denies wearing new eye makeup or products. Allergic to azithromycin, doxycycline, sumatriptan, augmentin.      Eye Problem   The left eye is affected. The current episode started in the past 7 days. The problem occurs constantly. The problem has been gradually worsening. The injury mechanism is unknown. The pain is at a severity of 5/10. The pain is mild. There is No known exposure to pink eye. She Wears contacts. Associated symptoms include itching. Pertinent negatives include no blurred vision, eye discharge, double vision, eye redness (resolved), fever or photophobia. She has tried eye drops for the symptoms. The treatment provided no relief.       Constitution: Negative for chills and fever.   Eyes:  Positive for eye itching, eye pain and eyelid swelling. Negative for eye trauma, foreign body in eye, eye discharge, eye redness (resolved), photophobia, vision loss, double vision and blurred vision.      Objective:     Vitals:    05/22/24 0855   BP: 119/83   Pulse: 106   Temp: 97.7 °F (36.5 °C)       Physical Exam   Constitutional: She is oriented to person, place, and time. She appears well-developed.  Non-toxic appearance. She does not appear ill. No distress.   HENT:   Head: Normocephalic and atraumatic.   Ears:   Right Ear: External " ear normal.   Left Ear: External ear normal.   Nose: Nose normal.   Mouth/Throat: Oropharynx is clear and moist.   Eyes: Conjunctivae, EOM and lids are normal. Pupils are equal, round, and reactive to light. Lids are everted and swept, no foreign bodies found. Right eye exhibits no discharge. Left eye exhibits hordeolum. Left eye exhibits no discharge. Right conjunctiva is not injected. Left conjunctiva is not injected. No scleral icterus. Extraocular movement intact gaze aligned appropriately periorbital hyperpigmentation  Neck: Trachea normal and phonation normal. Neck supple.   Cardiovascular: Normal rate, regular rhythm and normal heart sounds.   Pulmonary/Chest: Effort normal and breath sounds normal. No stridor. No respiratory distress. She has no wheezes. She has no rhonchi. She has no rales.   Musculoskeletal: Normal range of motion.         General: Normal range of motion.   Neurological: She is alert and oriented to person, place, and time.   Skin: Skin is warm, dry, intact and not diaphoretic.   Psychiatric: Her speech is normal and behavior is normal. Judgment and thought content normal.   Nursing note and vitals reviewed.      Assessment:     1. Hordeolum externum of left lower eyelid        Plan:       Hordeolum externum of left lower eyelid        Patient Instructions   PLEASE READ YOUR DISCHARGE INSTRUCTIONS ENTIRELY AS IT CONTAINS IMPORTANT INFORMATION.  If your condition worsens or fails to improve we recommend that you receive another evaluation at the ER immediately or contact your PCP to discuss your concerns or return here. You must understand that you've received an urgent care treatment only and that you may be released before all your medical problems are known or treated. You the patient will arrange for followup care as instructed.     - It can be managed with warm compresses for 15 minutes at a time to the area and gently massage in order to facilitate drainage.     - If the lesion does  not reduce in size within one to two weeks, you should be referred to an ophthalmologist for incision and drainage    - Use the ointment 4 times daily.     - Do not attempt to open the area yourself.     - Do not wear your contact lens ( if you use them) for at least 5 days after you stop having symptoms and are rechecked by your doctor. Throw away the contacts, contact solution and carrying case you were using and start with new material.     If you develop increase eye symptoms or change in your vision seek medical care immediately either with your ophthalomologist or the ER or return here.   Please go to the emergency room if you experience loss of vision, swelling around your eye (upper and lower), fever.     Please arrange follow up with your primary medical clinic as soon as possible. You must understand that you've received an Urgent Care treatment only and that you may be released before all of your medical problems are known or treated. You, the patient, will arrange for follow up as instructed. If your symptoms worsen or fail to improve you should go to the Emergency Room.

## 2024-05-22 NOTE — LETTER
May 22, 2024      [facility]  [address] , [address]  [facility] [address]  Phone: [phone]  Fax: [phone]       Patient: Madeleine Sheikh   YOB: 1970  Date of Visit: 05/22/2024    To Whom It May Concern:    Celine Sheikh  was at [facility] on 05/22/2024. The patient may return to work/school on 5/23/2024 with no restrictions. If you have any questions or concerns, or if I can be of further assistance, please do not hesitate to contact me.    Sincerely,        Nuria Reyna PA-C

## 2024-05-22 NOTE — PATIENT INSTRUCTIONS
PLEASE READ YOUR DISCHARGE INSTRUCTIONS ENTIRELY AS IT CONTAINS IMPORTANT INFORMATION.  If your condition worsens or fails to improve we recommend that you receive another evaluation at the ER immediately or contact your PCP to discuss your concerns or return here. You must understand that you've received an urgent care treatment only and that you may be released before all your medical problems are known or treated. You the patient will arrange for followup care as instructed.     - It can be managed with warm compresses for 15 minutes at a time to the area and gently massage in order to facilitate drainage.     - If the lesion does not reduce in size within one to two weeks, you should be referred to an ophthalmologist for incision and drainage    - Use the ointment 4 times daily.     - Do not attempt to open the area yourself.     - Do not wear your contact lens ( if you use them) for at least 5 days after you stop having symptoms and are rechecked by your doctor. Throw away the contacts, contact solution and carrying case you were using and start with new material.     If you develop increase eye symptoms or change in your vision seek medical care immediately either with your ophthalomologist or the ER or return here.   Please go to the emergency room if you experience loss of vision, swelling around your eye (upper and lower), fever.     Please arrange follow up with your primary medical clinic as soon as possible. You must understand that you've received an Urgent Care treatment only and that you may be released before all of your medical problems are known or treated. You, the patient, will arrange for follow up as instructed. If your symptoms worsen or fail to improve you should go to the Emergency Room.

## 2024-05-23 RX ORDER — CELECOXIB 100 MG/1
100 CAPSULE ORAL
Qty: 90 CAPSULE | Refills: 0 | Status: SHIPPED | OUTPATIENT
Start: 2024-05-23

## 2024-05-23 RX ORDER — LEVOTHYROXINE SODIUM 75 UG/1
75 TABLET ORAL
Qty: 90 TABLET | Refills: 0 | Status: SHIPPED | OUTPATIENT
Start: 2024-05-23

## 2024-05-23 RX ORDER — PANTOPRAZOLE SODIUM 40 MG/1
40 TABLET, DELAYED RELEASE ORAL
Qty: 90 TABLET | Refills: 0 | Status: SHIPPED | OUTPATIENT
Start: 2024-05-23

## 2024-05-23 RX ORDER — VENLAFAXINE HYDROCHLORIDE 150 MG/1
150 CAPSULE, EXTENDED RELEASE ORAL DAILY
Qty: 90 CAPSULE | Refills: 0 | Status: SHIPPED | OUTPATIENT
Start: 2024-05-23

## 2024-05-23 RX ORDER — LIOTHYRONINE SODIUM 5 UG/1
5 TABLET ORAL 3 TIMES DAILY
Qty: 270 TABLET | Refills: 0 | Status: SHIPPED | OUTPATIENT
Start: 2024-05-23

## 2024-12-04 ENCOUNTER — TELEPHONE (OUTPATIENT)
Dept: ORTHOPEDICS | Facility: CLINIC | Age: 54
End: 2024-12-04
Payer: COMMERCIAL

## 2024-12-04 NOTE — TELEPHONE ENCOUNTER
I called the patient today regarding scheduling an appointment. I left a message for the patient to call me back. I left my name and phone number.      Sincerely,  Neela Morocho [facility]   Certified Clinical Medical Assistant to Dr. Rebel sabillon  Phone: [phone]  Fax: [phone]

## 2024-12-04 NOTE — TELEPHONE ENCOUNTER
Spoke to pt and was able to get her scheduled with Dr. Love on January 23, 2024.    Sincerely,  Neela Morocho, [facility]   Certified Clinical Medical Assistant to Dr. Rebel sabillon  Phone: [phone]  Fax: [phone]

## 2025-01-06 DIAGNOSIS — M17.0 ARTHRITIS OF BOTH KNEES: Primary | ICD-10-CM

## 2025-01-22 ENCOUNTER — PATIENT MESSAGE (OUTPATIENT)
Dept: ORTHOPEDICS | Facility: CLINIC | Age: 55
End: 2025-01-22
Payer: COMMERCIAL

## 2025-01-29 ENCOUNTER — TELEPHONE (OUTPATIENT)
Dept: ORTHOPEDICS | Facility: CLINIC | Age: 55
End: 2025-01-29
Payer: COMMERCIAL

## 2025-01-29 NOTE — TELEPHONE ENCOUNTER
I called the patient today regarding her voice message. Patient was supposed to come in and get bilateral knee iaCSI with Dr. Love on 2/7. Patient is unable to make it. Patient is going to get them done on Monday with [facility]. The patient said she would like another prescription of the compound cream . The patient verbalized understanding and has no further questions.         ----- Message from Dwight sent at 1/29/2025  8:53 AM CST -----  Regarding: Appt  Contact: pt [phone]  Pt is requesting call back to discuss appt, pt appt was reschedule due to weather, pt is currently scheduled for 2/7, pt will not be able to make this appt, please call pt @[phone]

## 2025-02-03 ENCOUNTER — OFFICE VISIT (OUTPATIENT)
Dept: ORTHOPEDICS | Facility: CLINIC | Age: 55
End: 2025-02-03
Payer: COMMERCIAL

## 2025-02-03 DIAGNOSIS — M17.0 PRIMARY OSTEOARTHRITIS OF BOTH KNEES: Primary | ICD-10-CM

## 2025-02-03 PROCEDURE — 99999 PR PBB SHADOW E&M-EST. PATIENT-LVL III: CPT | Mod: PBBFAC,,, | Performed by: NURSE PRACTITIONER

## 2025-02-03 PROCEDURE — 99213 OFFICE O/P EST LOW 20 MIN: CPT | Mod: 25,S$GLB,, | Performed by: NURSE PRACTITIONER

## 2025-02-03 PROCEDURE — 20610 DRAIN/INJ JOINT/BURSA W/O US: CPT | Mod: 50,S$GLB,, | Performed by: NURSE PRACTITIONER

## 2025-03-03 RX ADMIN — TRIAMCINOLONE ACETONIDE 80 MG: 40 INJECTION, SUSPENSION INTRA-ARTICULAR; INTRAMUSCULAR at 08:03

## 2025-03-11 NOTE — PROGRESS NOTES
CC: Pain of the Left Knee and Pain of the Right Knee      HPI:   Pt with c/o bilateral knee pain. The pain is aching and global. She has taken nsaids with some minimal relief. She was seen by Dr. Love in March 2024 and has known djd of both knees. She had cortisone injections which worked well until now. She would like to repeat that today. She is ambulating without assistive device. There isn't a limp.      ROS  General: denies fever and chills  Resp: no c/o sob  CVS: no c/o cp  MSK: c/o bilateral knee pain    PE  General: AAOx3, pleasant and cooperative  Resp: respirations even and unlabored  MSK: bilateral knee exam  0 degrees extension  120 degrees flexion  No warmth or erythema   - effusion     Assessment:  Bilateral knee djd    Plan:  Cortisone injections bilateral knees today  RICE  Nsaids prn  F/u as needed    Knee Injection Procedure Note  Diagnosis: bilateral knee degenerative arthritis  Indications: bilateral knee pain  Procedure Details: Verbal consent was obtained for the procedure. The injection site was identified and the skin was prepared with alcohol. The bilateral knee was injected from an anterolateral approach with 1 ml of Kenalog and 4 ml Lidocaine each under sterile technique using a 25 gauge needle. The needle was removed and the area cleansed and dressed.  Complications:  Patient tolerated the procedure well.    she was advised to rest the knee today, using ice and elevation as needed for comfort and swelling.Immediate relief of the knee pain may be short lived and secondary to the lidocaine. she may have an increase in discomfort tonight followed by steady improvement over the next several days. It may take 1-2 weeks following the injection to get the full benefit of the medication.

## 2025-03-18 RX ORDER — TRIAMCINOLONE ACETONIDE 40 MG/ML
80 INJECTION, SUSPENSION INTRA-ARTICULAR; INTRAMUSCULAR
Status: COMPLETED | OUTPATIENT
Start: 2025-02-03 | End: 2025-03-03